# Patient Record
Sex: FEMALE | Race: WHITE | NOT HISPANIC OR LATINO | URBAN - METROPOLITAN AREA
[De-identification: names, ages, dates, MRNs, and addresses within clinical notes are randomized per-mention and may not be internally consistent; named-entity substitution may affect disease eponyms.]

---

## 2020-04-20 ENCOUNTER — TELEPHONE (OUTPATIENT)
Dept: HEMATOLOGY/ONCOLOGY | Facility: CLINIC | Age: 55
End: 2020-04-20

## 2020-04-20 NOTE — TELEPHONE ENCOUNTER
Returned call to pt, explained need for medical records. Will email her the [facility] & once received, we can request her records & get her set up. She voiced understanding.

## 2020-04-20 NOTE — TELEPHONE ENCOUNTER
----- Message from Natasha Correia sent at 4/20/2020 10:24 AM CDT -----  Contact: Self      ----- Message -----  From: Kusum Apple  Sent: 4/20/2020  10:22 AM CDT  To: [facility] Staff    Pt is calling because Len Werner diagnosed her with blood clots in both lungs.  [facility] did not take her insurance and recommended she come to [facility], Dr. Cattie to be seen.  She is scared and requests to be seen, as soon as possible.    She can be reached at [phone].    Thank you.

## 2020-04-23 NOTE — TELEPHONE ENCOUNTER
Records received & scanned into media, called pt & scheduled virtual visit for tomorrow morning with . Pt very appreciative. Explained instructions for virtual visit & provided her with my number should she have any issues.

## 2020-04-24 ENCOUNTER — OFFICE VISIT (OUTPATIENT)
Dept: HEMATOLOGY/ONCOLOGY | Facility: CLINIC | Age: 55
End: 2020-04-24
Payer: COMMERCIAL

## 2020-04-24 DIAGNOSIS — I82.4Y3 ACUTE DEEP VEIN THROMBOSIS (DVT) OF PROXIMAL VEIN OF BOTH LOWER EXTREMITIES: ICD-10-CM

## 2020-04-24 DIAGNOSIS — E66.9 OBESITY (BMI 35.0-39.9 WITHOUT COMORBIDITY): ICD-10-CM

## 2020-04-24 DIAGNOSIS — I82.4Y3 ACUTE DEEP VEIN THROMBOSIS (DVT) OF PROXIMAL VEIN OF BOTH LOWER EXTREMITIES: Primary | ICD-10-CM

## 2020-04-24 DIAGNOSIS — M06.9 RHEUMATOID ARTHRITIS, INVOLVING UNSPECIFIED SITE, UNSPECIFIED RHEUMATOID FACTOR PRESENCE: ICD-10-CM

## 2020-04-24 DIAGNOSIS — I10 ESSENTIAL HYPERTENSION: ICD-10-CM

## 2020-04-24 DIAGNOSIS — Z86.711 PERSONAL HISTORY OF PULMONARY EMBOLISM: ICD-10-CM

## 2020-04-24 DIAGNOSIS — E05.90 HYPERTHYROIDISM: ICD-10-CM

## 2020-04-24 DIAGNOSIS — I26.09 ACUTE PULMONARY EMBOLISM WITH ACUTE COR PULMONALE, UNSPECIFIED PULMONARY EMBOLISM TYPE: ICD-10-CM

## 2020-04-24 PROBLEM — I82.4Y9 ACUTE DEEP VEIN THROMBOSIS (DVT) OF PROXIMAL VEIN OF LOWER EXTREMITY: Status: ACTIVE | Noted: 2020-04-24

## 2020-04-24 PROCEDURE — 99203 OFFICE O/P NEW LOW 30 MIN: CPT | Mod: 95,,, | Performed by: INTERNAL MEDICINE

## 2020-04-24 PROCEDURE — 99203 PR OFFICE/OUTPT VISIT, NEW, LEVL III, 30-44 MIN: ICD-10-PCS | Mod: 95,,, | Performed by: INTERNAL MEDICINE

## 2020-04-24 RX ORDER — METHIMAZOLE 5 MG/1
5 TABLET ORAL EVERY OTHER DAY
Qty: 15 TABLET | Refills: 11 | Status: SHIPPED | OUTPATIENT
Start: 2020-04-24 | End: 2020-10-13 | Stop reason: SDUPTHER

## 2020-04-24 RX ORDER — CAPTOPRIL 12.5 MG/1
12.5 TABLET ORAL 2 TIMES DAILY
Qty: 180 TABLET | Refills: 3 | Status: SHIPPED | OUTPATIENT
Start: 2020-04-24 | End: 2020-10-13 | Stop reason: SDUPTHER

## 2020-04-24 RX ORDER — BISOPROLOL FUMARATE AND HYDROCHLOROTHIAZIDE 5; 6.25 MG/1; MG/1
1 TABLET ORAL DAILY
Qty: 30 TABLET | Refills: 11 | Status: SHIPPED | OUTPATIENT
Start: 2020-04-24 | End: 2020-10-13 | Stop reason: SDUPTHER

## 2020-04-24 NOTE — PROGRESS NOTES
CONSULT TELEMEDICINE VISIT    Subjective:      Patient ID: Francia Rod is a 55 y.o. female.    An audio and visual care visit was performed with the patient because of the COVID-19 pandemic recommendations for social distancing.    TELEMEDICINE  The patient location is: home  The chief complaint leading to consultation is: Pulmonary embolism   Visit type: Virtual visit with synchronous audio and video  Total time spent with patient: 35 minutes  Each patient to whom he or she provides medical services by telemedicine is:  (1) informed of the relationship between the physician and patient and the respective role of any other health care provider with respect to management of the patient; and (2) notified that he or she may decline to receive medical services by telemedicine and may withdraw from such care at any time.    History of Present Illness:   HPI   Patient is a 55 year old woman who was recently admitted to [facility] with bilateral PE and DVT earlier this month. She reports a long car ride to [place] in March. 3 days prior to admission, she developed worsening shortness of breath, substernal and exacerbated by minimal activity. She also reported a low grade fever of 100.3.   In the ER, a CTA showed several bilateral pulmonary emboli with signs of cardiac strain. Venous ultrasound performed showed DVT involving the left femoral vein, popliteal vein and left posterior tibialis vein and the right posterior tibialis vein. D dimer was elevated to 6.55. BNP was 6923. She was started on full dose lovenox . She tested negative for COVID-19.   She was seen by Dr. MALIK conde and a catheter directed thrombolysis was attempted for acute submassive PE and acute cor pulmonale; however, it was unsuccessful. The patient continued to improve on Lovenox and was switched to Eliquis 5 mg po bid at the time of discharge.     This is her second PE. She reports having sudden shortness of breath in 2006 and was diagnosed with a PE.  It was attributed to her OCP use which was then stopped. She was treated with warfarin for 6 months.   She denies any family history of DVT, PE, clotting or bleeding disorders.     She also has a history of RA, hyperthryroidism, HTN and morbid obesity.     Oncology History:   No history exists.      Past medical, surgical, family, and social history were reviewed today and there are no changes of note unless mentioned in HPI.   MEDS and ALLERGIES were reviewed with patient and meds reconciled.     History:  No past medical history on file.   History reviewed. No pertinent surgical history.  No family history on file.   Social History     Tobacco Use    Smoking status: Not on file   Substance and Sexual Activity    Alcohol use: Not on file    Drug use: Not on file    Sexual activity: Not on file        ROS:  Review of Systems   Constitutional: Negative for fever, malaise/fatigue and weight loss.   HENT: Negative for congestion, hearing loss, nosebleeds and sore throat.    Eyes: Negative for double vision and photophobia.   Respiratory: Positive for shortness of breath (improving ). Negative for cough, hemoptysis, sputum production and wheezing.    Cardiovascular: Negative for chest pain, palpitations, orthopnea and leg swelling.   Gastrointestinal: Negative for abdominal pain, blood in stool, constipation, diarrhea, heartburn, nausea and vomiting.   Genitourinary: Negative for dysuria, hematuria and urgency.   Musculoskeletal: Positive for joint pain. Negative for back pain and myalgias.   Skin: Negative for itching and rash.   Neurological: Negative for dizziness, tingling, seizures, weakness and headaches.   Endo/Heme/Allergies: Negative for polydipsia. Does not bruise/bleed easily.   Psychiatric/Behavioral: Positive for depression. Negative for memory loss. The patient is not nervous/anxious and does not have insomnia.        Objective:   There were no vitals filed for this visit.  Wt Readings from Last 10  Encounters:   No data found for Wt       Physical Examination:   Constitutional: she is alert, pleasant, and does not appear to be in any physical distress   HENT: Mouth/Throat:  Tongue is midline without evidence of glossitis  Eyes: No obvious jaundice or conjunctivitis.  EOM are normal.   Pulmonary/Chest: No stridor noted. No excess chest muscle movement.  Neurological: she is alert and oriented to person, place, and time. No cranial nerve deficit.  Skin:  No rash noted. No erythema.   Psychiatric: she has a normal mood and affect. Speech and memory normal.     Diagnostic Tests:  Significant Imaging: I have reviewed and interpreted all pertinent imaging results/findings.    Laboratory Data:  All pertinent labs have been reviewed.    Labs:   No results found for: WBC, RBC, HGB, HCT, MCV, PLT, GLU, NA, K, BUN, CREATININE, AST, ALT, BILITOT  Assessment/Plan:   Acute deep vein thrombosis (DVT) of proximal vein of both lower extremities  Acute pulmonary embolism with acute cor pulmonale, unspecified pulmonary embolism type  Personal history of pulmonary embolism    This is her second episode of PE, with acute cor pulmonale. We discussed that clinically, she is hypercoagulable. I would check hypercoagulable work up prior to her next visit. Regardless, she needs indefinite anticoagulation as tolerated. Continue Eliquis 5 mg po bid.   She also needs cancer screening. She is due for a screening mammogram and a colonoscopy and we will order them once COVID-19 restrictions are lifted.     Rheumatoid arthritis, involving unspecified site, unspecified rheumatoid factor presence  Previously treated with MTX, currently off treatment. Follow up with her rheumatologist.     Essential hypertension  Continue Captopril and bisoprolol-hctz. I will refill her medications as she does not have a PMD as of now. We will refer to PCP    Hyperthyroidism  Continue Methimazole. Recent TFTs are normal, reported on outside records.     Obesity  (BMI 35.0-39.9 without comorbidity)  The importance of a healthy diet and exercise was emphasized.        ECOG SCORE    1 - Restricted in strenuous activity-ambulatory and able to carry out work of a light nature       Discussion:   Follow up in about 3 months (around 7/24/2020) for MD, Lab Results.    Plan was discussed with the patient at length, and she verbalized understanding. Francia was given an opportunity to ask questions that were answered to her satisfaction, and she was advised to call in the interval if any problems or questions arise.  Discussed COVID-19 and social distancing in great detail, avoid all non-essential visits out of the home if possible and avoid sick contacts.     Electronically signed by Kim Romo MD

## 2020-04-27 RX ORDER — CAPTOPRIL 12.5 MG/1
12.5 TABLET ORAL 3 TIMES DAILY
Qty: 270 TABLET | Refills: 3 | Status: SHIPPED | OUTPATIENT
Start: 2020-04-27 | End: 2020-10-23 | Stop reason: SDUPTHER

## 2020-06-02 ENCOUNTER — HOSPITAL ENCOUNTER (EMERGENCY)
Facility: HOSPITAL | Age: 55
Discharge: HOME | End: 2020-06-02
Attending: EMERGENCY MEDICINE
Payer: COMMERCIAL

## 2020-06-02 VITALS
TEMPERATURE: 99 F | SYSTOLIC BLOOD PRESSURE: 218 MMHG | RESPIRATION RATE: 18 BRPM | WEIGHT: 239 LBS | OXYGEN SATURATION: 98 % | HEIGHT: 64 IN | HEART RATE: 82 BPM | BODY MASS INDEX: 40.8 KG/M2 | DIASTOLIC BLOOD PRESSURE: 93 MMHG

## 2020-06-02 DIAGNOSIS — R10.9 ABDOMINAL PAIN: ICD-10-CM

## 2020-06-02 DIAGNOSIS — R10.13 EPIGASTRIC ABDOMINAL PAIN: Primary | ICD-10-CM

## 2020-06-02 PROCEDURE — 93010 ELECTROCARDIOGRAM REPORT: CPT | Mod: ,,, | Performed by: INTERNAL MEDICINE

## 2020-06-02 PROCEDURE — 93005 ELECTROCARDIOGRAM TRACING: CPT

## 2020-06-02 PROCEDURE — 99285 PR EMERGENCY DEPT VISIT,LEVEL V: ICD-10-PCS | Mod: ,,, | Performed by: EMERGENCY MEDICINE

## 2020-06-02 PROCEDURE — 93010 EKG 12-LEAD: ICD-10-PCS | Mod: ,,, | Performed by: INTERNAL MEDICINE

## 2020-06-02 PROCEDURE — 99285 EMERGENCY DEPT VISIT HI MDM: CPT | Mod: ,,, | Performed by: EMERGENCY MEDICINE

## 2020-06-02 PROCEDURE — 99283 EMERGENCY DEPT VISIT LOW MDM: CPT | Mod: 25

## 2020-06-03 NOTE — ED TRIAGE NOTES
Francia WHEELER Marcel, a 55 y.o. female presents to the ED w/ complaint of     Triage note:  Chief Complaint   Patient presents with    Abdominal Pain     Pt states having blood clot on 4/12 and started on eliquis. Pt states having right sided pain mainly under ribs since Sunday. Pt describes pain as sharp and intermittent. Pt denies SOB and chest pain.     Review of patient's allergies indicates:  No Known Allergies  Past Medical History:   Diagnosis Date    Arthritis     DVT (deep venous thrombosis)     Hypertension     Thyroid disease

## 2020-06-03 NOTE — ED PROVIDER NOTES
Encounter Date: 6/2/2020       History     Chief Complaint   Patient presents with    Abdominal Pain     Pt states having blood clot on 4/12 and started on eliquis. Pt states having right sided pain mainly under ribs since Sunday. Pt describes pain as sharp and intermittent. Pt denies SOB and chest pain.     HPI   55-year-old female with a history of DVT on Eliquis, hypertension, arthritis and thyroid disease presenting with right-sided epigastric and abdominal pain that was intermittent.  Pain rated at 1/10 and described as gassy/colic pain in the epigastrium and right side.  Denies any chest pain, lightheadedness, dizziness, shortness of breath, dyspnea, back pain, pleuritic pain, lightheadedness or dizziness.  No other symptoms at this time.  Pain is described as intermittent, and sharp.  Denies any recent traumas, urinary symptoms, fevers, chills, nausea or vomiting.    Review of patient's allergies indicates:  No Known Allergies  Past Medical History:   Diagnosis Date    Arthritis     DVT (deep venous thrombosis)     Hypertension     Thyroid disease      Past Surgical History:   Procedure Laterality Date    CHOLECYSTECTOMY       History reviewed. No pertinent family history.  Social History     Tobacco Use    Smoking status: Never Smoker    Smokeless tobacco: Never Used   Substance Use Topics    Alcohol use: Yes    Drug use: Never     Review of Systems   Constitutional: Negative for chills, fatigue and fever.   HENT: Negative for congestion, mouth sores, sinus pressure and sore throat.    Eyes: Negative for photophobia.   Respiratory: Negative for cough, shortness of breath, wheezing and stridor.    Cardiovascular: Negative for chest pain, palpitations and leg swelling.   Gastrointestinal: Positive for abdominal pain. Negative for constipation, diarrhea, nausea, rectal pain and vomiting.   Genitourinary: Negative for dysuria, flank pain and hematuria.   Musculoskeletal: Negative for gait problem,  joint swelling, myalgias, neck pain and neck stiffness.   Skin: Negative for color change, pallor, rash and wound.   Neurological: Negative for tremors, facial asymmetry, weakness, light-headedness, numbness and headaches.   Psychiatric/Behavioral: Negative for agitation and confusion. The patient is not nervous/anxious.        Physical Exam     Initial Vitals [06/02/20 2046]   BP Pulse Resp Temp SpO2   (!) 218/93 82 18 98.7 °F (37.1 °C) 98 %      MAP       --         Physical Exam    Nursing note and vitals reviewed.    Gen/Constitutional: Interactive. No acute distress  Head: Normocephalic, Atraumatic  Neck: supple, no masses or LAD, no JVD  Eyes: PERRLA, conjunctiva clear  Ears, Nose and Throat: No rhinorrhea or stridor.  Cardiac:  Regular rate, Reg Rhythm, No murmur, rubs or gallops  Pulmonary: CTA Bilat, no wheezes, rhonchi, rales.  GI: Abdomen soft, non-tender, non-distended; no rebound or guarding  : No CVA tenderness.  Musculoskeletal: Extremities warm, well perfused, no erythema, no edema  Skin: No rashes, cyanosis or jaundice  Neuro: Alert and Oriented x 3; No focal motor or sensory deficits.    Psych: Normal affect      ED Course   Procedures  Labs Reviewed - No data to display       Imaging Results    None          Medical Decision Making:   History:   Old Medical Records: I decided to obtain old medical records.  Initial Assessment:   55-year-old female with a history of DVT on Eliquis, hypertension, arthritis and thyroid disease presenting with right-sided epigastric and abdominal pain that was intermittent.  Differential Diagnosis:   Differential diagnosis includes but not limited to:  Musculoskeletal, reflux, gas pain, ACS, PE, infectious etiology, perforation, obstruction.  Independently Interpreted Test(s):   I have ordered and independently interpreted EKG Reading(s) - see prior notes  Clinical Tests:   Medical Tests: Ordered and Reviewed    Urgent evaluation of patient presenting with  epigastric and abdominal pain that is intermittent, mild in severity.  She is afebrile vital signs are stable.  There is no tachycardia, hypoxemia or tachypnea.  She is hypertensive at baseline, and is on current medications.  She denies any active chest pain, or any other symptoms.  Lung sounds clear, cardiac exam unremarkable.  ECG obtained with no signs of ischemia or STEMI on my read.  Normal sinus rhythm throughout.  On repeat evaluation, no tachycardia, nontender exam, and does not describe any pleuritic pain.  She has a history of DVTs currently on Eliquis and taking Eliquis as prescribed.  No high risk features to include tachycardia, tachypnea or hypoxemia, doubt PE.  Doubt ACS based on ECG, exam and history and findings.  I had a long discussion with the patient regarding further imaging to include CTA PE protocol however given absence of ECG findings, no tachycardia, no tachypnea or hypoxemia with no clinical findings to suggest dyspnea or shortness of breath or pleuritic pain, patient is low risk at this time.  I did offer her CT scan however based on a shared discussion and shared model, will wait on any further imaging as symptoms are improving and unlikely to be PE.  However if symptoms worsen, patient struck to return to the emergency department for close evaluation and further imaging. Patient agreeable to discharge plan. Strict ED precautions and return instructions discussed at length and patient verbalized understanding. All questions were answered and ample time was given for questions.      Complexity:  High risk                               Clinical Impression:       ICD-10-CM ICD-9-CM   1. Epigastric abdominal pain R10.13 789.06   2. Abdominal pain R10.9 789.00         Disposition:   Disposition: Discharged  Condition: Stable     ED Disposition Condition    Discharge Stable        ED Prescriptions     None        Follow-up Information     Follow up With Specialties Details Why Contact Info  Additional Information    [facility] - Internal Medicine Internal Medicine Schedule an appointment as soon as possible for a visit in 3 days If symptoms worsen [address] [id number] [phone] [facility] Bld[facility].    [facility] Emergency Medicine Go to  If symptoms worsen [address] [id number] [phone]                     Dre Wells DO, [facility]  Emergency Staff Physician   Dept of Emergency Medicine   [facility]  Spectralink: [id number]                     Dre Wells DO  06/02/20 2328

## 2020-06-03 NOTE — DISCHARGE INSTRUCTIONS
Today, your evaluation included vital signs and exam.  Your blood pressure was elevated, as you have hypertension.  However your pulse, oxygen levels and breathing rate were all normal.  We had a long discussion regarding taking Eliquis, and symptoms of blood clots.  You deny having any chest pain or shortness of breath.  However if the symptoms do occur, please return to the emergency department or follow up with your primary care immediately.  Continue taking your Eliquis as prescribed.    Our goal in the emergency department is to always give you outstanding care and exceptional service. You may receive a survey by mail or e-mail in the next week regarding your experience in our ED. We would greatly appreciate your completing and returning the survey. Your feedback provides us with a way to recognize our staff who give very good care and it helps us learn how to improve when your experience was below our aspiration of excellence.

## 2020-06-19 DIAGNOSIS — I82.4Y3 ACUTE DEEP VEIN THROMBOSIS (DVT) OF PROXIMAL VEIN OF BOTH LOWER EXTREMITIES: Primary | ICD-10-CM

## 2020-07-24 ENCOUNTER — OFFICE VISIT (OUTPATIENT)
Dept: HEMATOLOGY/ONCOLOGY | Facility: CLINIC | Age: 55
End: 2020-07-24
Payer: COMMERCIAL

## 2020-07-24 ENCOUNTER — LAB VISIT (OUTPATIENT)
Dept: LAB | Facility: HOSPITAL | Age: 55
End: 2020-07-24
Attending: INTERNAL MEDICINE
Payer: COMMERCIAL

## 2020-07-24 VITALS
DIASTOLIC BLOOD PRESSURE: 82 MMHG | OXYGEN SATURATION: 98 % | RESPIRATION RATE: 14 BRPM | BODY MASS INDEX: 42.01 KG/M2 | HEART RATE: 67 BPM | HEIGHT: 64 IN | WEIGHT: 246.06 LBS | SYSTOLIC BLOOD PRESSURE: 181 MMHG | TEMPERATURE: 98 F

## 2020-07-24 DIAGNOSIS — I26.09 ACUTE PULMONARY EMBOLISM WITH ACUTE COR PULMONALE, UNSPECIFIED PULMONARY EMBOLISM TYPE: Primary | ICD-10-CM

## 2020-07-24 DIAGNOSIS — I10 ESSENTIAL HYPERTENSION: ICD-10-CM

## 2020-07-24 DIAGNOSIS — I82.4Y3 ACUTE DEEP VEIN THROMBOSIS (DVT) OF PROXIMAL VEIN OF BOTH LOWER EXTREMITIES: ICD-10-CM

## 2020-07-24 DIAGNOSIS — Z86.711 PERSONAL HISTORY OF PULMONARY EMBOLISM: ICD-10-CM

## 2020-07-24 LAB
ALBUMIN SERPL BCP-MCNC: 4.2 G/DL (ref 3.5–5.2)
ALP SERPL-CCNC: 99 U/L (ref 38–145)
ALT SERPL W/O P-5'-P-CCNC: 20 U/L (ref 0–35)
ANION GAP SERPL CALC-SCNC: 6 MMOL/L (ref 8–16)
AST SERPL-CCNC: 36 U/L (ref 14–36)
BASOPHILS # BLD AUTO: 0.06 K/UL (ref 0–0.2)
BASOPHILS NFR BLD: 0.6 % (ref 0–1.9)
BILIRUB SERPL-MCNC: 0.6 MG/DL (ref 0.2–1.3)
BUN SERPL-MCNC: 19 MG/DL (ref 7–18)
CALCIUM SERPL-MCNC: 9.7 MG/DL (ref 8.4–10.2)
CHLORIDE SERPL-SCNC: 103 MMOL/L (ref 95–110)
CO2 SERPL-SCNC: 27 MMOL/L (ref 22–31)
CREAT SERPL-MCNC: 0.82 MG/DL (ref 0.5–1.4)
DIFFERENTIAL METHOD: ABNORMAL
EOSINOPHIL # BLD AUTO: 0.3 K/UL (ref 0–0.5)
EOSINOPHIL NFR BLD: 2.4 % (ref 0–8)
ERYTHROCYTE [DISTWIDTH] IN BLOOD BY AUTOMATED COUNT: 14.5 % (ref 11.5–14.5)
EST. GFR  (AFRICAN AMERICAN): >60 ML/MIN/1.73 M^2
EST. GFR  (NON AFRICAN AMERICAN): >60 ML/MIN/1.73 M^2
GLUCOSE SERPL-MCNC: 145 MG/DL (ref 70–110)
HCT VFR BLD AUTO: 41.1 % (ref 37–48.5)
HGB BLD-MCNC: 13.3 G/DL (ref 12–16)
IMM GRANULOCYTES # BLD AUTO: 0.03 K/UL (ref 0–0.04)
IMM GRANULOCYTES NFR BLD AUTO: 0.3 % (ref 0–0.5)
LYMPHOCYTES # BLD AUTO: 1.8 K/UL (ref 1–4.8)
LYMPHOCYTES NFR BLD: 17.5 % (ref 18–48)
MCH RBC QN AUTO: 30.7 PG (ref 27–31)
MCHC RBC AUTO-ENTMCNC: 32.4 G/DL (ref 32–36)
MCV RBC AUTO: 95 FL (ref 82–98)
MONOCYTES # BLD AUTO: 0.8 K/UL (ref 0.3–1)
MONOCYTES NFR BLD: 7.8 % (ref 4–15)
NEUTROPHILS # BLD AUTO: 7.4 K/UL (ref 1.8–7.7)
NEUTROPHILS NFR BLD: 71.4 % (ref 38–73)
NRBC BLD-RTO: 0 /100 WBC
PLATELET # BLD AUTO: 306 K/UL (ref 150–350)
PMV BLD AUTO: 10.7 FL (ref 9.2–12.9)
POTASSIUM SERPL-SCNC: 4.5 MMOL/L (ref 3.5–5.1)
PROT SERPL-MCNC: 8.2 G/DL (ref 6–8.4)
RBC # BLD AUTO: 4.33 M/UL (ref 4–5.4)
SODIUM SERPL-SCNC: 136 MMOL/L (ref 136–145)
WBC # BLD AUTO: 10.41 K/UL (ref 3.9–12.7)

## 2020-07-24 PROCEDURE — 3079F DIAST BP 80-89 MM HG: CPT | Mod: CPTII,S$GLB,, | Performed by: INTERNAL MEDICINE

## 2020-07-24 PROCEDURE — 99214 OFFICE O/P EST MOD 30 MIN: CPT | Mod: S$GLB,,, | Performed by: INTERNAL MEDICINE

## 2020-07-24 PROCEDURE — 80053 COMPREHEN METABOLIC PANEL: CPT

## 2020-07-24 PROCEDURE — 3077F PR MOST RECENT SYSTOLIC BLOOD PRESSURE >= 140 MM HG: ICD-10-PCS | Mod: CPTII,S$GLB,, | Performed by: INTERNAL MEDICINE

## 2020-07-24 PROCEDURE — 3008F PR BODY MASS INDEX (BMI) DOCUMENTED: ICD-10-PCS | Mod: CPTII,S$GLB,, | Performed by: INTERNAL MEDICINE

## 2020-07-24 PROCEDURE — 3077F SYST BP >= 140 MM HG: CPT | Mod: CPTII,S$GLB,, | Performed by: INTERNAL MEDICINE

## 2020-07-24 PROCEDURE — 85025 COMPLETE CBC W/AUTO DIFF WBC: CPT

## 2020-07-24 PROCEDURE — 3079F PR MOST RECENT DIASTOLIC BLOOD PRESSURE 80-89 MM HG: ICD-10-PCS | Mod: CPTII,S$GLB,, | Performed by: INTERNAL MEDICINE

## 2020-07-24 PROCEDURE — 80053 COMPREHEN METABOLIC PANEL: CPT | Mod: PN

## 2020-07-24 PROCEDURE — 99214 PR OFFICE/OUTPT VISIT, EST, LEVL IV, 30-39 MIN: ICD-10-PCS | Mod: S$GLB,,, | Performed by: INTERNAL MEDICINE

## 2020-07-24 PROCEDURE — 36415 COLL VENOUS BLD VENIPUNCTURE: CPT | Mod: PN

## 2020-07-24 PROCEDURE — 99999 PR PBB SHADOW E&M-EST. PATIENT-LVL IV: ICD-10-PCS | Mod: PBBFAC,,, | Performed by: INTERNAL MEDICINE

## 2020-07-24 PROCEDURE — 85025 COMPLETE CBC W/AUTO DIFF WBC: CPT | Mod: PN

## 2020-07-24 PROCEDURE — 99999 PR PBB SHADOW E&M-EST. PATIENT-LVL IV: CPT | Mod: PBBFAC,,, | Performed by: INTERNAL MEDICINE

## 2020-07-24 PROCEDURE — 3008F BODY MASS INDEX DOCD: CPT | Mod: CPTII,S$GLB,, | Performed by: INTERNAL MEDICINE

## 2020-07-24 NOTE — PROGRESS NOTES
PROGRESS NOTE    Subjective:       Patient ID: Francia Rod is a 55 y.o. female.    Chief Complaint: Results      History of Present Illness:   Francia Rod is a 55 y.o. female who presents with recurrent DVT and PE.     Briefly, she was diagnosed with a PE in 2006 when she presented with sudden shortness of breath.  This episode was attributed to OCP use which was then stopped.  She was treated with warfarin for 6 months.    More recently, in April 2020, she presented to [facility] with bilateral PE and DVT.  She does report a long car ride to and [place].  Three days prior to admission, she developed worsening shortness of breath, substernal chest pain exacerbated with minimal activity as well as a low-grade fever.    As previously documented,a CTA showed several bilateral pulmonary emboli with signs of cardiac strain. Venous ultrasound performed showed DVT involving the left femoral vein, popliteal vein and left posterior tibialis vein and the right posterior tibialis vein. D dimer was elevated to 6.55. BNP was 6923. She was started on full dose lovenox . She tested negative for COVID-19.   She was seen by Dr. MALIK conde and a catheter directed thrombolysis was attempted for acute submassive PE and acute cor pulmonale; however, it was unsuccessful. The patient continued to improve on Lovenox and was switched to Eliquis 5 mg po bid at the time of discharge.     She has remained on Apixaban since her last visit. I did receive a message from her in June when she developed right-sided chest pain and was referred back to the emergency department.  Workup was negative and she was discharged home after her symptoms improved spontaneously.  Since then, she reports mild-to-moderate fatigue as well as rheumatoid arthritis-related joint pains.  She has recently resumed weekly methotrexate.  She denies any leg swelling, chest pain or shortness of breath  today.  She continues to tolerate apixaban well without any easy bruising or bleeding.    Oncology History:  Oncology History    No history exists.       History:  Past Medical History:   Diagnosis Date    Arthritis     DVT (deep venous thrombosis)     Hypertension     Thyroid disease       Past Surgical History:   Procedure Laterality Date    CHOLECYSTECTOMY       Family History   Problem Relation Age of Onset    Heart attack Mother     Rheum arthritis Father     Cancer Father     Heart attack Paternal Uncle     Colon cancer Paternal Grandmother     Heart attack Maternal Uncle      Social History     Tobacco Use    Smoking status: Never Smoker    Smokeless tobacco: Never Used   Substance and Sexual Activity    Alcohol use: Yes    Drug use: Never    Sexual activity: Not on file        ROS:   Review of Systems   Constitutional: Positive for malaise/fatigue. Negative for fever and weight loss.   HENT: Negative for congestion, hearing loss, nosebleeds and sore throat.    Eyes: Negative for double vision and photophobia.   Respiratory: Negative for cough, hemoptysis, sputum production, shortness of breath and wheezing.    Cardiovascular: Negative for chest pain, palpitations, orthopnea and leg swelling.   Gastrointestinal: Negative for abdominal pain, blood in stool, constipation, diarrhea, heartburn, nausea and vomiting.   Genitourinary: Negative for dysuria, hematuria and urgency.   Musculoskeletal: Positive for joint pain (RA, on MTX weekly ). Negative for back pain and myalgias.   Skin: Negative for itching and rash.   Neurological: Negative for dizziness, tingling, seizures, weakness and headaches.   Endo/Heme/Allergies: Negative for polydipsia. Does not bruise/bleed easily.   Psychiatric/Behavioral: Negative for depression and memory loss. The patient is not nervous/anxious and does not have insomnia.         Objective:     Vitals:    07/24/20 1046   BP: (!) 181/82   Pulse: 67   Resp: 14   Temp:  "97.8 °F (36.6 °C)   TempSrc: Temporal   SpO2: 98%   Weight: 111.6 kg (246 lb 0.5 oz)   Height: 5' 4" (1.626 m)   PainSc: 0-No pain     Wt Readings from Last 10 Encounters:   07/24/20 111.6 kg (246 lb 0.5 oz)   06/02/20 108.4 kg (239 lb)       Physical Examination:   Physical Exam   Constitutional: She is oriented to person, place, and time and well-developed, well-nourished, and in no distress. No distress.   HENT:   Head: Normocephalic.   Mouth/Throat: Oropharynx is clear and moist. No oropharyngeal exudate.   Eyes: Conjunctivae and EOM are normal. No scleral icterus.   Neck: Neck supple. No thyromegaly present.   Cardiovascular: Normal rate, regular rhythm and normal heart sounds.   No murmur heard.  Pulmonary/Chest: Effort normal. No stridor. No respiratory distress. She has no wheezes. She has no rales. She exhibits no tenderness.   Abdominal: Soft. Bowel sounds are normal. She exhibits no distension and no mass. There is no abdominal tenderness. There is no rebound.   Musculoskeletal: Normal range of motion.         General: No tenderness, deformity or edema.   Lymphadenopathy:     She has no cervical adenopathy.   Neurological: She is alert and oriented to person, place, and time. No cranial nerve deficit. Gait normal. Coordination normal.   Skin: Skin is warm and dry. No rash noted. She is not diaphoretic. No erythema.   Psychiatric: Memory, affect and judgment normal.   Nursing note and vitals reviewed.       Diagnostic Tests:  Significant Imaging: I have reviewed and interpreted all pertinent imaging results/findings.  CT Chest Without Contrast No results found for this or any previous visit.  CT Chest With ContrastNo results found for this or any previous visit.  CT Abdomen/Pelvis Without Contrast No results found for this or any previous visit.   CT Abdomen/Pelvis With Contrast No results found for this or any previous visit.  CT Abdomen/Pelvis With Without Contrast No results found for this or any " previous visit.   PET Scan No results found for this or any previous visit.    Laboratory Data:  All pertinent labs have been reviewed.  Labs:   Lab Results   Component Value Date    WBC 10.41 07/24/2020    RBC 4.33 07/24/2020    HGB 13.3 07/24/2020    HCT 41.1 07/24/2020    MCV 95 07/24/2020     07/24/2020     (H) 07/24/2020     07/24/2020    K 4.5 07/24/2020    BUN 19 (H) 07/24/2020    CREATININE 0.82 07/24/2020    AST 36 07/24/2020    ALT 20 07/24/2020    BILITOT 0.6 07/24/2020       Assessment/Plan:   Acute pulmonary embolism with acute cor pulmonale, unspecified pulmonary embolism type  Acute deep vein thrombosis (DVT) of proximal vein of both lower extremities  Personal history of pulmonary embolism  This is her second episode of PE, with acute cor pulmonale. We discussed that clinically, she is hypercoagulable and I would continue anticoagulation indefinitely.   Labs are within normal limits. She did not complete hypercoagulable work up and I will order it prior to her next visit.    Continue Eliquis 5 mg po bid.     She also needs cancer screening. She is due for a screening mammogram and a colonoscopy and she will schedule these through her PMD.      Essential hypertension  Systolic blood pressure is in the 180s range during today's visit.  She attributes it to anxiety associated with a clinic visit.  She does report that her blood pressure is better controlled when she checks at home.  I have instructed her to start recording blood pressure at home and keep a log that she should discuss with her primary care physician and make adjustments as needed.  I discussed the importance of a normal blood pressure while on anticoagulation.   She is in need of a primary care physician and will find one close to house.  We are able to assist if she needs any help.  She verbalized understanding.    ECOG SCORE    0 - Fully active-able to carry on all pre-disease performance without restriction            Discussion:   Follow up in about 3 months (around 10/24/2020) for MD.    Plan was discussed with the patient at length, and she verbalized understanding. Francia was given an opportunity to ask questions that were answered to her satisfaction, and she was advised to call in the interval if any problems or questions arise.    Electronically signed by Kim Romo MD

## 2020-10-11 ENCOUNTER — PATIENT MESSAGE (OUTPATIENT)
Dept: HEMATOLOGY/ONCOLOGY | Facility: CLINIC | Age: 55
End: 2020-10-11

## 2020-10-12 ENCOUNTER — PATIENT MESSAGE (OUTPATIENT)
Dept: HEMATOLOGY/ONCOLOGY | Facility: CLINIC | Age: 55
End: 2020-10-12

## 2020-10-13 DIAGNOSIS — I10 ESSENTIAL HYPERTENSION: ICD-10-CM

## 2020-10-13 DIAGNOSIS — I82.4Y3 ACUTE DEEP VEIN THROMBOSIS (DVT) OF PROXIMAL VEIN OF BOTH LOWER EXTREMITIES: ICD-10-CM

## 2020-10-13 DIAGNOSIS — Z86.711 PERSONAL HISTORY OF PULMONARY EMBOLISM: Primary | ICD-10-CM

## 2020-10-13 DIAGNOSIS — I26.09 ACUTE PULMONARY EMBOLISM WITH ACUTE COR PULMONALE, UNSPECIFIED PULMONARY EMBOLISM TYPE: ICD-10-CM

## 2020-10-13 DIAGNOSIS — E05.90 HYPERTHYROIDISM: ICD-10-CM

## 2020-10-14 RX ORDER — BISOPROLOL FUMARATE AND HYDROCHLOROTHIAZIDE 5; 6.25 MG/1; MG/1
1 TABLET ORAL DAILY
Qty: 30 TABLET | Refills: 11 | Status: SHIPPED | OUTPATIENT
Start: 2020-10-14 | End: 2020-10-23 | Stop reason: SDUPTHER

## 2020-10-14 RX ORDER — METHIMAZOLE 5 MG/1
5 TABLET ORAL EVERY OTHER DAY
Qty: 15 TABLET | Refills: 11 | Status: SHIPPED | OUTPATIENT
Start: 2020-10-14 | End: 2021-10-25

## 2020-10-14 RX ORDER — CAPTOPRIL 12.5 MG/1
12.5 TABLET ORAL 2 TIMES DAILY
Qty: 180 TABLET | Refills: 3 | Status: SHIPPED | OUTPATIENT
Start: 2020-10-14 | End: 2021-11-10

## 2020-10-16 ENCOUNTER — TELEPHONE (OUTPATIENT)
Dept: HEMATOLOGY/ONCOLOGY | Facility: CLINIC | Age: 55
End: 2020-10-16

## 2020-10-16 NOTE — TELEPHONE ENCOUNTER
----- Message from Natalie Ness sent at 10/16/2020  8:40 AM CDT -----  Contact: pt  Patient called she is at [facility] now in [facility] and they do not have her lab orders.  Will you be able to fax them to  [phone]     Call back [phone]

## 2020-10-16 NOTE — TELEPHONE ENCOUNTER
Unable to reach Lashaun and person at main number unable to assist with what they need to process the lab

## 2020-10-16 NOTE — TELEPHONE ENCOUNTER
S/w pt and Compology re: lab orders faxed/not received.  Gave verbal orders and dx codes needed with call back information if she needs clarification.

## 2020-10-16 NOTE — TELEPHONE ENCOUNTER
----- Message from Jessica Kapadia sent at 10/16/2020 12:45 PM CDT -----  Lashaun with [employer] is calling.    She needs the test code for cardiolipin antibody.  Please call her at [phone] with that code. Thank you!

## 2020-10-21 LAB
ALBUMIN SERPL-MCNC: 4 G/DL (ref 3.6–5.1)
ALBUMIN/GLOB SERPL: 1.1 (CALC) (ref 1–2.5)
ALP SERPL-CCNC: 91 U/L (ref 37–153)
ALT SERPL-CCNC: 29 U/L (ref 6–29)
AST SERPL-CCNC: 25 U/L (ref 10–35)
AT III ACT/NOR PPP CHRO: 115 % NORMAL (ref 80–135)
B2 GLYCOPROT1 IGG SER-ACNC: <9 SGU
B2 GLYCOPROT1 IGM SER-ACNC: <9 SMU
BASOPHILS # BLD AUTO: 42 CELLS/UL (ref 0–200)
BASOPHILS NFR BLD AUTO: 0.5 %
BILIRUB SERPL-MCNC: 0.6 MG/DL (ref 0.2–1.2)
BUN SERPL-MCNC: 17 MG/DL (ref 7–25)
BUN/CREAT SERPL: ABNORMAL (CALC) (ref 6–22)
CALCIUM SERPL-MCNC: 9.5 MG/DL (ref 8.6–10.4)
CARDIOLIPIN IGA SER IA-ACNC: <11 APL
CHLORIDE SERPL-SCNC: 102 MMOL/L (ref 98–110)
CO2 SERPL-SCNC: 26 MMOL/L (ref 20–32)
CREAT SERPL-MCNC: 0.9 MG/DL (ref 0.5–1.05)
EOSINOPHIL # BLD AUTO: 126 CELLS/UL (ref 15–500)
EOSINOPHIL NFR BLD AUTO: 1.5 %
ERYTHROCYTE [DISTWIDTH] IN BLOOD BY AUTOMATED COUNT: 13.6 % (ref 11–15)
F2 C.20210G>A GENO BLD/T: NORMAL
F2 GENE MUT ANL BLD/T: NORMAL
F5 GENE MUT ANL BLD/T: NORMAL
F5 P.R506Q BLD/T QL: NORMAL
GFRSERPLBLD MDRD-ARVRAT: 71 ML/MIN/1.73M2
GLOBULIN SER CALC-MCNC: 3.6 G/DL (CALC) (ref 1.9–3.7)
GLUCOSE SERPL-MCNC: 129 MG/DL (ref 65–99)
HCT VFR BLD AUTO: 42.7 % (ref 35–45)
HGB BLD-MCNC: 13.8 G/DL (ref 11.7–15.5)
LYMPHOCYTES # BLD AUTO: 1588 CELLS/UL (ref 850–3900)
LYMPHOCYTES NFR BLD AUTO: 18.9 %
MCH RBC QN AUTO: 30.9 PG (ref 27–33)
MCHC RBC AUTO-ENTMCNC: 32.3 G/DL (ref 32–36)
MCV RBC AUTO: 95.5 FL (ref 80–100)
MONOCYTES # BLD AUTO: 437 CELLS/UL (ref 200–950)
MONOCYTES NFR BLD AUTO: 5.2 %
NEUTROPHILS # BLD AUTO: 6208 CELLS/UL (ref 1500–7800)
NEUTROPHILS NFR BLD AUTO: 73.9 %
PLATELET # BLD AUTO: 291 THOUSAND/UL (ref 140–400)
PMV BLD REES-ECKER: 11.6 FL (ref 7.5–12.5)
POTASSIUM SERPL-SCNC: 4.5 MMOL/L (ref 3.5–5.3)
PROT SERPL-MCNC: 7.6 G/DL (ref 6.1–8.1)
RBC # BLD AUTO: 4.47 MILLION/UL (ref 3.8–5.1)
SCREEN DRVVT: 39 SECONDS
SODIUM SERPL-SCNC: 137 MMOL/L (ref 135–146)
TRACKING HOUSE ACCOUNT: NORMAL
WBC # BLD AUTO: 8.4 THOUSAND/UL (ref 3.8–10.8)

## 2020-10-23 ENCOUNTER — OFFICE VISIT (OUTPATIENT)
Dept: HEMATOLOGY/ONCOLOGY | Facility: CLINIC | Age: 55
End: 2020-10-23
Payer: COMMERCIAL

## 2020-10-23 DIAGNOSIS — Z79.01 LONG TERM (CURRENT) USE OF ANTICOAGULANTS: ICD-10-CM

## 2020-10-23 DIAGNOSIS — I10 ESSENTIAL HYPERTENSION: ICD-10-CM

## 2020-10-23 DIAGNOSIS — I82.4Y3 ACUTE DEEP VEIN THROMBOSIS (DVT) OF PROXIMAL VEIN OF BOTH LOWER EXTREMITIES: Primary | ICD-10-CM

## 2020-10-23 DIAGNOSIS — Z86.711 PERSONAL HISTORY OF PULMONARY EMBOLISM: ICD-10-CM

## 2020-10-23 DIAGNOSIS — I26.09 ACUTE PULMONARY EMBOLISM WITH ACUTE COR PULMONALE, UNSPECIFIED PULMONARY EMBOLISM TYPE: ICD-10-CM

## 2020-10-23 DIAGNOSIS — D68.51 FACTOR V LEIDEN MUTATION: ICD-10-CM

## 2020-10-23 DIAGNOSIS — E05.90 HYPERTHYROIDISM: ICD-10-CM

## 2020-10-23 PROCEDURE — 99213 OFFICE O/P EST LOW 20 MIN: CPT | Mod: 95,,, | Performed by: INTERNAL MEDICINE

## 2020-10-23 PROCEDURE — 99213 PR OFFICE/OUTPT VISIT, EST, LEVL III, 20-29 MIN: ICD-10-PCS | Mod: 95,,, | Performed by: INTERNAL MEDICINE

## 2020-10-23 RX ORDER — BISOPROLOL FUMARATE AND HYDROCHLOROTHIAZIDE 5; 6.25 MG/1; MG/1
1 TABLET ORAL DAILY
Qty: 30 TABLET | Refills: 11 | Status: SHIPPED | OUTPATIENT
Start: 2020-10-23 | End: 2022-01-18 | Stop reason: SDUPTHER

## 2020-10-23 RX ORDER — CAPTOPRIL 12.5 MG/1
12.5 TABLET ORAL 3 TIMES DAILY
Qty: 270 TABLET | Refills: 3 | Status: SHIPPED | OUTPATIENT
Start: 2020-10-23 | End: 2021-05-14

## 2020-10-23 NOTE — PROGRESS NOTES
FOLLOW UP TELEMEDICINE VISIT    Subjective:      Patient ID: Francia Rod is a 55 y.o. female.  MRN: [id number]  : 1965    An audio and visual care visit was performed with the patient because of the COVID-19 pandemic recommendations for social distancing.    TELEMEDICINE  The patient location is: home  The chief complaint leading to consultation is:  Hypercoagulable state, pulmonary embolism  Visit type: Virtual visit with synchronous audio and video    Total time spent with patient: 15minutes  25 minutes of total time spent on the encounter, which includes face to face time and non-face to face time preparing to see the patient (eg, review of tests), obtaining and/or reviewing separately obtained history, documenting clinical information in the electronic or other health record, independently interpreting results (not separately reported) and communicating results to the patient/family/caregiver, or care coordination (not separately reported).    Each patient to whom he or she provides medical services by telemedicine is:  (1) informed of the relationship between the physician and patient and the respective role of any other health care provider with respect to management of the patient; and (2) notified that he or she may decline to receive medical services by telemedicine and may withdraw from such care at any time.    History of Present Illness:   HPI   Francia Rod is a 55 y.o. female who presents with recurrent DVT and PE.      Briefly, she was diagnosed with a PE in  when she presented with sudden shortness of breath.  This episode was attributed to OCP use which was then stopped.  She was treated with warfarin for 6 months.     More recently, in 2020, she presented to [facility] with bilateral PE and DVT.  She does report a long car ride to and [place].  Three days prior to admission, she developed worsening shortness of breath, substernal chest pain exacerbated with minimal  activity as well as a low-grade fever.     As previously documented,a CTA showed several bilateral pulmonary emboli with signs of cardiac strain. Venous ultrasound performed showed DVT involving the left femoral vein, popliteal vein and left posterior tibialis vein and the right posterior tibialis vein. D dimer was elevated to 6.55. BNP was 6923. She was started on full dose lovenox . She tested negative for COVID-19.   She was seen by  CV amaya and a catheter directed thrombolysis was attempted for acute submassive PE and acute cor pulmonale; however, it was unsuccessful. The patient continued to improve on Lovenox and was switched to Eliquis 5 mg po bid at the time of discharge.        Interim history:   She presents today to discuss her symptoms, labs and further recommendations.  She had hypercoagulable workup done and it is consistent with heterozygous factor 5 Leiden mutation.  She has remained on Apixaban since her last visit without any bruising, bleeding or other complications.  She denies any worsening shortness of breath.  She reports intermittent lower extremity swelling.  She denies any calf tenderness.     She is in need of a new primary care physician and requested her antihypertensives and thyroid medications to be refilled today.      Oncology History:  Oncology History    No history exists.      Past medical, surgical, family, and social history were reviewed today and there are no changes of note unless mentioned in HPI.   MEDS and ALLERGIES were reviewed with patient and meds reconciled.     History:  Past Medical History:   Diagnosis Date    Arthritis     DVT (deep venous thrombosis)     Factor V Leiden mutation 10/23/2020    Hypertension     Thyroid disease       Past Surgical History:   Procedure Laterality Date    CHOLECYSTECTOMY       Family History   Problem Relation Age of Onset    Heart attack Mother     Rheum arthritis Father     Cancer Father     Heart attack Paternal  Uncle     Colon cancer Paternal Grandmother     Heart attack Maternal Uncle       Social History     Tobacco Use    Smoking status: Never Smoker    Smokeless tobacco: Never Used   Substance and Sexual Activity    Alcohol use: Yes    Drug use: Never    Sexual activity: Not on file        ROS:  Review of Systems   Constitutional: Negative for fever, malaise/fatigue and weight loss.   HENT: Negative for congestion, hearing loss, nosebleeds and sore throat.    Eyes: Negative for double vision and photophobia.   Respiratory: Negative for cough, hemoptysis, sputum production, shortness of breath and wheezing.    Cardiovascular: Positive for leg swelling. Negative for chest pain, palpitations and orthopnea.   Gastrointestinal: Negative for abdominal pain, blood in stool, constipation, diarrhea, heartburn, nausea and vomiting.   Genitourinary: Negative for dysuria, hematuria and urgency.   Musculoskeletal: Positive for joint pain. Negative for back pain and myalgias.   Skin: Negative for itching and rash.   Neurological: Negative for dizziness, tingling, seizures, weakness and headaches.   Endo/Heme/Allergies: Negative for polydipsia. Does not bruise/bleed easily.   Psychiatric/Behavioral: Negative for depression and memory loss. The patient is not nervous/anxious and does not have insomnia.        Objective:   There were no vitals filed for this visit.  Wt Readings from Last 10 Encounters:   07/24/20 111.6 kg (246 lb 0.5 oz)   06/02/20 108.4 kg (239 lb)       Physical Examination:   Constitutional: she is alert, pleasant, and does not appear to be in any physical distress   HENT: Mouth/Throat:  Tongue is midline without evidence of glossitis  Eyes: No obvious jaundice or conjunctivitis.  EOM are normal.   Pulmonary/Chest: No stridor noted. No excess chest muscle movement.  Neurological: she is alert and oriented to person, place, and time. No cranial nerve deficit.  Skin:  No rash noted. No erythema.   Psychiatric:  she has a normal mood and affect. Speech and memory normal.     Diagnostic Tests:  Significant Imaging: I have reviewed and interpreted all pertinent imaging results/findings.    Laboratory Data:  All pertinent labs have been reviewed.    Labs:   Lab Results   Component Value Date    WBC 10.41 07/24/2020    RBC 4.33 07/24/2020    HGB 13.3 07/24/2020    HCT 41.1 07/24/2020    MCV 95 07/24/2020     07/24/2020     (H) 07/24/2020     07/24/2020    K 4.5 07/24/2020    BUN 19 (H) 07/24/2020    CREATININE 0.82 07/24/2020    AST 36 07/24/2020    ALT 20 07/24/2020    BILITOT 0.6 07/24/2020     Assessment/Plan:   Acute deep vein thrombosis (DVT) of proximal vein of both lower extremities  Acute pulmonary embolism with acute cor pulmonale, unspecified pulmonary embolism type  Personal history of pulmonary embolism  Factor V Leiden mutation  Given her history and heterozygous status for factor 5 Leiden mutation, she is a candidate for long-term anticoagulation.  Continue apixaban 5 mg p.o. b.i.d..  I will see her back in 6 months or earlier in case of any concerns.    Long term (current) use of anticoagulants  Continue apixaban.    Hyperthyroidism  Continue methimazole.  Follow-up with primary care physician.    Essential hypertension  -     bisoprolol-hydrochlorothiazide 5-6.25 mg (ZIAC) 5-6.25 mg Tab; Take 1 tablet by mouth once daily.  Dispense: 30 tablet; Refill: 11  -     captopriL (CAPOTEN) 12.5 MG tablet; Take 1 tablet (12.5 mg total) by mouth 3 (three) times daily.  Dispense: 270 tablet; Refill: 3       ECOG SCORE    1 - Restricted in strenuous activity-ambulatory and able to carry out work of a light nature       Discussion:   Follow up in about 6 months (around 4/23/2021) for MD.    Plan was discussed with the patient at length, and she verbalized understanding. Francia was given an opportunity to ask questions that were answered to her satisfaction, and she was advised to call in the interval if  any problems or questions arise.  Discussed COVID-19 and social distancing in great detail, avoid all non-essential visits out of the home if possible and avoid sick contacts.     Electronically signed by Kim Romo MD

## 2020-12-05 ENCOUNTER — HOSPITAL ENCOUNTER (EMERGENCY)
Facility: HOSPITAL | Age: 55
Discharge: HOME | End: 2020-12-05
Attending: EMERGENCY MEDICINE
Payer: COMMERCIAL

## 2020-12-05 VITALS
SYSTOLIC BLOOD PRESSURE: 152 MMHG | RESPIRATION RATE: 16 BRPM | WEIGHT: 249 LBS | BODY MASS INDEX: 42.51 KG/M2 | TEMPERATURE: 99 F | DIASTOLIC BLOOD PRESSURE: 72 MMHG | OXYGEN SATURATION: 100 % | HEIGHT: 64 IN | HEART RATE: 58 BPM

## 2020-12-05 DIAGNOSIS — M25.571 ANKLE PAIN, RIGHT: Primary | ICD-10-CM

## 2020-12-05 PROCEDURE — 99284 EMERGENCY DEPT VISIT MOD MDM: CPT | Mod: ,,, | Performed by: EMERGENCY MEDICINE

## 2020-12-05 PROCEDURE — 25000003 PHARM REV CODE 250: Performed by: EMERGENCY MEDICINE

## 2020-12-05 PROCEDURE — 99284 PR EMERGENCY DEPT VISIT,LEVEL IV: ICD-10-PCS | Mod: ,,, | Performed by: EMERGENCY MEDICINE

## 2020-12-05 PROCEDURE — 99284 EMERGENCY DEPT VISIT MOD MDM: CPT | Mod: 25

## 2020-12-05 RX ORDER — NAPROXEN 500 MG/1
500 TABLET ORAL
Status: COMPLETED | OUTPATIENT
Start: 2020-12-05 | End: 2020-12-05

## 2020-12-05 RX ORDER — COLCHICINE 0.6 MG/1
0.6 TABLET, FILM COATED ORAL DAILY
COMMUNITY
End: 2023-07-13

## 2020-12-05 RX ADMIN — NAPROXEN 500 MG: 500 TABLET ORAL at 03:12

## 2020-12-05 NOTE — ED NOTES
LOC: The patient is awake and alert; oriented x 3 and speaking appropriately.  APPEARANCE: Patient resting comfortably, patient is clean and well groomed  SKIN: warm and dry, normal skin turgor & moist mucus membranes, skin intact, no breakdown noted. Abrasion to rt elbow.  MUSCULOSKELETAL: Patient moving all extremities well, no obvious swelling or deformities noted. pain in rt ankle  and rt elbow. RESPIRATORY: Airway is open and patent, respirations are spontaneous, normal effort and rate  CARDIAC: Patient has a normal rate, no peripheral edema noted, capillary refill < 3 seconds; No complaints of chest pain   ABDOMEN: Soft and non tender to palpation, no distention noted.

## 2020-12-05 NOTE — ED PROVIDER NOTES
Encounter Date: 12/5/2020    SCRIBE #1 NOTE: I, Charan Cox, am scribing for, and in the presence of,  Ashwin Preciado MD. I have scribed the following portions of the note - Other sections scribed: HPI, ROS, PE.       History     Chief Complaint   Patient presents with    Ankle Pain     twisted right ankle with trip and fall.  + pain and swelling.  Pt on Eloquis but did not hit head     Time patient was seen by the provider: 2:01 PM    The patient is a 55 y.o. female with past medical history of DVT, HTN, thyroid disease, and arthritis who presents to the ED with a complaint of right ankle s/p fall. The patient reports that she internally rotated her right ankle while at work. She reports that she was able to bear weight after the fall and was able to ambulate through the pain. Patient reports that she can wiggle her toes and has sensation in her foot. Pain improves when she changes her shoes. Patient reports that she has been taking tylenol for her pain. She reports being on blood thinner medication. Denies diarrhea, fever, chills, numbness, vomiting, nausea, dysuria, shortness of breath, fatigue, abdominal pain, and chest pain.    The history is provided by the patient and medical records.     Review of patient's allergies indicates:  No Known Allergies  Past Medical History:   Diagnosis Date    Arthritis     DVT (deep venous thrombosis)     Factor V Leiden mutation 10/23/2020    Hypertension     Pulmonary embolism     Shingles     Thyroid disease      Past Surgical History:   Procedure Laterality Date    CHOLECYSTECTOMY       Family History   Problem Relation Age of Onset    Heart attack Mother     Rheum arthritis Father     Cancer Father     Heart attack Paternal Uncle     Colon cancer Paternal Grandmother     Heart attack Maternal Uncle      Social History     Tobacco Use    Smoking status: Never Smoker    Smokeless tobacco: Never Used   Substance Use Topics    Alcohol use: Yes      "Comment: occassionally    Drug use: Never     Review of Systems  CONST: No fever, chills, or fatigue  HEENT: No headache, sore throat, or voice changes  HEART: No pain  LUNG: No SOB, cough, or other complaints  ABDOMEN: No pain, nausea, vomiting, diarrhea, constipation, or flank pain  : No discharge, dysuria, lesions, rashes, masses, sores  EXTREMITIES:Positive for right ankle pain. NO numbness  SKIN: No lesions, skin tears, rashes, trauma or other complaints    Physical Exam     Initial Vitals [12/05/20 1329]   BP Pulse Resp Temp SpO2   (!) 192/100 72 16 99 °F (37.2 °C) 99 %      MAP       --         Vitals:    12/05/20 1329 12/05/20 1534   BP: (!) 192/100 (!) 152/72   Pulse: 72 (!) 58   Resp: 16    Temp: 99 °F (37.2 °C)    TempSrc: Oral    SpO2: 99% 100%   Weight: 112.9 kg (249 lb)    Height: 5' 4" (1.626 m)    \    Physical Exam  GENERAL: Calm; Cooperative; Well-appearing and Non-Toxic; Overweight; NAD.  HEENT: AT/NC; speaking full sentences with no slurring of speech or drooling/inability to tolerate oral secretions.  NECK: Supple, FROM and atraumatic  THORAX/BACK: Atraumatic  HEART: Regular rate and rhythm, no M/G/T.  LUNGS: No Tachypnea, No Retractions, and CTA B/L with no W/R/R.  ABDOMEN: +BS, Soft, ND, NTTP.   EXTREMITIES: FROM. Strength 5/5. Symmetrical Sensorium and with no deficits. Soft Comparments. Good sensation intact. Good pulses. No gross deformity. Point tenderness over ATFL.  SKIN: Warm, Dry, No Skin Tears or Rashes.  VASCULAR: 2+ pulses Prox/Dist & Symmetrical with No delay.  NEUROLOGIC: AAOx3; answering questions appropriately with no focal peripheral neurologic sensory or motor deficits to lower extremity distal to ankle joint.    ED Course   Procedures  Labs Reviewed - No data to display       Imaging Results          X-Ray Tibia Fibula 2 View Right (Final result)  Result time 12/05/20 15:33:26    Final result by Erik Archibald MD (12/05/20 15:33:26)                 Impression:    "   Suspected nonspecific soft tissue swelling about the ankle, greatest laterally without convincing evidence for acute displaced fracture-dislocation noting suspected accessory ossicles or sequela of remote trauma with nonunion adjacent to the medial and lateral malleolar tips.  Correlate for point tenderness at these regions.      Electronically signed by: Erik Archibald MD  Date:    12/05/2020  Time:    15:33             Narrative:    EXAMINATION:  XR ANKLE COMPLETE 3 VIEW RIGHT; XR TIBIA FIBULA 2 VIEW RIGHT    CLINICAL HISTORY:  Pain in right ankle and joints of right foot    TECHNIQUE:  AP, lateral, and oblique images of the right ankle; AP and lateral views right tibia    COMPARISON:  None    FINDINGS:  Bones appear well mineralized.  Prominence of the soft tissues about the ankle, greatest laterally suggesting nonspecific swelling.  Small well corticated ossific bodies adjacent to the medial and also lateral malleolar tips suggesting sequela of remote trauma with nonunion versus accessory ossicles.  Ankle mortise is otherwise well aligned and appears intact aside from mild degenerative change.  Mild degenerative change at the imaged knee.  No evidence of acute displaced fracture, dislocation or destructive osseous process.  Prominent enthesophyte at the Achilles insertion site.  No subcutaneous emphysema or radiodense retained foreign body.                               X-Ray Ankle Complete Right (Final result)  Result time 12/05/20 15:33:26    Final result by Erik Archibald MD (12/05/20 15:33:26)                 Impression:      Suspected nonspecific soft tissue swelling about the ankle, greatest laterally without convincing evidence for acute displaced fracture-dislocation noting suspected accessory ossicles or sequela of remote trauma with nonunion adjacent to the medial and lateral malleolar tips.  Correlate for point tenderness at these regions.      Electronically signed by: Erik Archibald  MD  Date:    12/05/2020  Time:    15:33             Narrative:    EXAMINATION:  XR ANKLE COMPLETE 3 VIEW RIGHT; XR TIBIA FIBULA 2 VIEW RIGHT    CLINICAL HISTORY:  Pain in right ankle and joints of right foot    TECHNIQUE:  AP, lateral, and oblique images of the right ankle; AP and lateral views right tibia    COMPARISON:  None    FINDINGS:  Bones appear well mineralized.  Prominence of the soft tissues about the ankle, greatest laterally suggesting nonspecific swelling.  Small well corticated ossific bodies adjacent to the medial and also lateral malleolar tips suggesting sequela of remote trauma with nonunion versus accessory ossicles.  Ankle mortise is otherwise well aligned and appears intact aside from mild degenerative change.  Mild degenerative change at the imaged knee.  No evidence of acute displaced fracture, dislocation or destructive osseous process.  Prominent enthesophyte at the Achilles insertion site.  No subcutaneous emphysema or radiodense retained foreign body.                                 Medical Decision Making:   History:   Old Medical Records: I decided to obtain old medical records.  Old Records Summarized: records from clinic visits.  Clinical Tests:   Radiological Study: Ordered and Reviewed            Scribe Attestation:   Scribe #1: I performed the above scribed service and the documentation accurately describes the services I performed. I attest to the accuracy of the note.    STAFF ATTENDING PHYSICIAN NOTE:  I provided and agree with the documentation provided by ALEXI on Francia Rod.  ____________________  Sylvester Preciado MD, [facility]  Emergency Medicine Staff                    Clinical Impression:     ICD-10-CM ICD-9-CM   1. Ankle pain, right  M25.571 719.47                      Disposition:   Disposition: Discharged  Condition: Stable     ED Disposition Condition    Discharge Stable        ED Prescriptions     Over-the-counter acetaminophen versus NSAIDs as tolerated p.r.n..         Follow-up Information     Follow up With Specialties Details Why Contact Info    [facility] Emergency Medicine  If symptoms worsen [address] [facility] [address] [phone]                                       Ashwin Preciado MD  12/06/20 0815

## 2020-12-05 NOTE — ED TRIAGE NOTES
Fell 2 hrs PTA  Walking out a door and missed testep.  Pain in rt ankle. , swollen. Painful to bear weight. Struck rt elbow- has abrasion.  Denies striking head or LOC.

## 2020-12-05 NOTE — DISCHARGE INSTRUCTIONS
X-Ray Tibia Fibula 2 View Right (Final result)  Result time 12/05/20 15:33:26  Final result by Erik Archibald MD (12/05/20 15:33:26)                Impression:      Suspected nonspecific soft tissue swelling about the ankle, greatest laterally without convincing evidence for acute displaced fracture-dislocation noting suspected accessory ossicles or sequela of remote trauma with nonunion adjacent to the medial and lateral malleolar tips.  Correlate for point tenderness at these regions.       Electronically signed by: Erik Archibald MD   Date: 12/05/2020   Time: 15:33            Narrative:    EXAMINATION:   XR ANKLE COMPLETE 3 VIEW RIGHT; XR TIBIA FIBULA 2 VIEW RIGHT     CLINICAL HISTORY:   Pain in right ankle and joints of right foot     TECHNIQUE:   AP, lateral, and oblique images of the right ankle; AP and lateral views right tibia     COMPARISON:   None     FINDINGS:   Bones appear well mineralized.  Prominence of the soft tissues about the ankle, greatest laterally suggesting nonspecific swelling.  Small well corticated ossific bodies adjacent to the medial and also lateral malleolar tips suggesting sequela of remote trauma with nonunion versus accessory ossicles.  Ankle mortise is otherwise well aligned and appears intact aside from mild degenerative change.  Mild degenerative change at the imaged knee.  No evidence of acute displaced fracture, dislocation or destructive osseous process.  Prominent enthesophyte at the Achilles insertion site.  No subcutaneous emphysema or radiodense retained foreign body.                     X-Ray Ankle Complete Right (Final result)  Result time 12/05/20 15:33:26  Final result by Erik Archibald MD (12/05/20 15:33:26)                Impression:      Suspected nonspecific soft tissue swelling about the ankle, greatest laterally without convincing evidence for acute displaced fracture-dislocation noting suspected accessory ossicles or sequela of remote trauma with nonunion  adjacent to the medial and lateral malleolar tips.  Correlate for point tenderness at these regions.       Electronically signed by: Erik Archibald MD   Date: 12/05/2020   Time: 15:33            Narrative:    EXAMINATION:   XR ANKLE COMPLETE 3 VIEW RIGHT; XR TIBIA FIBULA 2 VIEW RIGHT     CLINICAL HISTORY:   Pain in right ankle and joints of right foot     TECHNIQUE:   AP, lateral, and oblique images of the right ankle; AP and lateral views right tibia     COMPARISON:   None     FINDINGS:   Bones appear well mineralized.  Prominence of the soft tissues about the ankle, greatest laterally suggesting nonspecific swelling.  Small well corticated ossific bodies adjacent to the medial and also lateral malleolar tips suggesting sequela of remote trauma with nonunion versus accessory ossicles.  Ankle mortise is otherwise well aligned and appears intact aside from mild degenerative change.  Mild degenerative change at the imaged knee.  No evidence of acute displaced fracture, dislocation or destructive osseous process.  Prominent enthesophyte at the Achilles insertion site.  No subcutaneous emphysema or radiodense retained foreign body.

## 2021-02-08 ENCOUNTER — TELEPHONE (OUTPATIENT)
Dept: HEMATOLOGY/ONCOLOGY | Facility: CLINIC | Age: 56
End: 2021-02-08

## 2021-02-25 ENCOUNTER — PATIENT MESSAGE (OUTPATIENT)
Dept: HEMATOLOGY/ONCOLOGY | Facility: CLINIC | Age: 56
End: 2021-02-25

## 2021-04-13 ENCOUNTER — PATIENT MESSAGE (OUTPATIENT)
Dept: HEMATOLOGY/ONCOLOGY | Facility: CLINIC | Age: 56
End: 2021-04-13

## 2021-04-13 ENCOUNTER — TELEPHONE (OUTPATIENT)
Dept: HEMATOLOGY/ONCOLOGY | Facility: CLINIC | Age: 56
End: 2021-04-13

## 2021-05-14 ENCOUNTER — TELEPHONE (OUTPATIENT)
Dept: HEMATOLOGY/ONCOLOGY | Facility: CLINIC | Age: 56
End: 2021-05-14

## 2021-05-14 ENCOUNTER — OFFICE VISIT (OUTPATIENT)
Dept: HEMATOLOGY/ONCOLOGY | Facility: CLINIC | Age: 56
End: 2021-05-14
Payer: COMMERCIAL

## 2021-05-14 ENCOUNTER — PATIENT MESSAGE (OUTPATIENT)
Dept: HEMATOLOGY/ONCOLOGY | Facility: CLINIC | Age: 56
End: 2021-05-14

## 2021-05-14 DIAGNOSIS — I26.09 ACUTE PULMONARY EMBOLISM WITH ACUTE COR PULMONALE, UNSPECIFIED PULMONARY EMBOLISM TYPE: Primary | ICD-10-CM

## 2021-05-14 DIAGNOSIS — I82.4Y3 ACUTE DEEP VEIN THROMBOSIS (DVT) OF PROXIMAL VEIN OF BOTH LOWER EXTREMITIES: ICD-10-CM

## 2021-05-14 DIAGNOSIS — M06.9 RHEUMATOID ARTHRITIS, INVOLVING UNSPECIFIED SITE, UNSPECIFIED WHETHER RHEUMATOID FACTOR PRESENT: ICD-10-CM

## 2021-05-14 DIAGNOSIS — I10 ESSENTIAL HYPERTENSION: ICD-10-CM

## 2021-05-14 DIAGNOSIS — Z79.01 LONG TERM (CURRENT) USE OF ANTICOAGULANTS: ICD-10-CM

## 2021-05-14 DIAGNOSIS — E66.9 OBESITY (BMI 35.0-39.9 WITHOUT COMORBIDITY): ICD-10-CM

## 2021-05-14 DIAGNOSIS — D68.51 FACTOR V LEIDEN MUTATION: ICD-10-CM

## 2021-05-14 PROCEDURE — 99213 OFFICE O/P EST LOW 20 MIN: CPT | Mod: 95,,, | Performed by: INTERNAL MEDICINE

## 2021-05-14 PROCEDURE — 99213 PR OFFICE/OUTPT VISIT, EST, LEVL III, 20-29 MIN: ICD-10-PCS | Mod: 95,,, | Performed by: INTERNAL MEDICINE

## 2021-08-19 ENCOUNTER — PATIENT MESSAGE (OUTPATIENT)
Dept: BARIATRICS | Facility: CLINIC | Age: 56
End: 2021-08-19

## 2021-09-24 ENCOUNTER — TELEPHONE (OUTPATIENT)
Dept: HEMATOLOGY/ONCOLOGY | Facility: CLINIC | Age: 56
End: 2021-09-24

## 2021-09-30 ENCOUNTER — TELEPHONE (OUTPATIENT)
Dept: HEMATOLOGY/ONCOLOGY | Facility: CLINIC | Age: 56
End: 2021-09-30

## 2021-10-01 ENCOUNTER — TELEPHONE (OUTPATIENT)
Dept: HEMATOLOGY/ONCOLOGY | Facility: CLINIC | Age: 56
End: 2021-10-01

## 2021-10-01 ENCOUNTER — PATIENT MESSAGE (OUTPATIENT)
Dept: HEMATOLOGY/ONCOLOGY | Facility: CLINIC | Age: 56
End: 2021-10-01

## 2021-11-12 ENCOUNTER — PATIENT MESSAGE (OUTPATIENT)
Dept: HEMATOLOGY/ONCOLOGY | Facility: CLINIC | Age: 56
End: 2021-11-12
Payer: COMMERCIAL

## 2021-11-19 ENCOUNTER — LAB VISIT (OUTPATIENT)
Dept: LAB | Facility: HOSPITAL | Age: 56
End: 2021-11-19
Attending: INTERNAL MEDICINE
Payer: COMMERCIAL

## 2021-11-19 ENCOUNTER — OFFICE VISIT (OUTPATIENT)
Dept: HEMATOLOGY/ONCOLOGY | Facility: CLINIC | Age: 56
End: 2021-11-19
Payer: COMMERCIAL

## 2021-11-19 VITALS
DIASTOLIC BLOOD PRESSURE: 90 MMHG | HEART RATE: 77 BPM | HEIGHT: 64 IN | RESPIRATION RATE: 18 BRPM | BODY MASS INDEX: 42.01 KG/M2 | TEMPERATURE: 98 F | SYSTOLIC BLOOD PRESSURE: 140 MMHG | OXYGEN SATURATION: 98 % | WEIGHT: 246.06 LBS

## 2021-11-19 DIAGNOSIS — I26.09 ACUTE PULMONARY EMBOLISM WITH ACUTE COR PULMONALE, UNSPECIFIED PULMONARY EMBOLISM TYPE: ICD-10-CM

## 2021-11-19 DIAGNOSIS — I82.4Y3 ACUTE DEEP VEIN THROMBOSIS (DVT) OF PROXIMAL VEIN OF BOTH LOWER EXTREMITIES: ICD-10-CM

## 2021-11-19 DIAGNOSIS — I82.4Y3 ACUTE DEEP VEIN THROMBOSIS (DVT) OF PROXIMAL VEIN OF BOTH LOWER EXTREMITIES: Primary | ICD-10-CM

## 2021-11-19 DIAGNOSIS — Z79.01 LONG TERM (CURRENT) USE OF ANTICOAGULANTS: ICD-10-CM

## 2021-11-19 DIAGNOSIS — D68.51 FACTOR V LEIDEN MUTATION: ICD-10-CM

## 2021-11-19 DIAGNOSIS — Z86.711 PERSONAL HISTORY OF PULMONARY EMBOLISM: ICD-10-CM

## 2021-11-19 LAB
ALBUMIN SERPL BCP-MCNC: 3.8 G/DL (ref 3.5–5.2)
ALP SERPL-CCNC: 111 U/L (ref 55–135)
ALT SERPL W/O P-5'-P-CCNC: 19 U/L (ref 10–44)
ANION GAP SERPL CALC-SCNC: 14 MMOL/L (ref 8–16)
AST SERPL-CCNC: 19 U/L (ref 10–40)
BASOPHILS # BLD AUTO: 0.06 K/UL (ref 0–0.2)
BASOPHILS NFR BLD: 0.7 % (ref 0–1.9)
BILIRUB SERPL-MCNC: 0.6 MG/DL (ref 0.1–1)
BUN SERPL-MCNC: 17 MG/DL (ref 6–20)
CALCIUM SERPL-MCNC: 9.6 MG/DL (ref 8.7–10.5)
CHLORIDE SERPL-SCNC: 100 MMOL/L (ref 95–110)
CO2 SERPL-SCNC: 24 MMOL/L (ref 23–29)
CREAT SERPL-MCNC: 0.9 MG/DL (ref 0.5–1.4)
DIFFERENTIAL METHOD: ABNORMAL
EOSINOPHIL # BLD AUTO: 0.1 K/UL (ref 0–0.5)
EOSINOPHIL NFR BLD: 1.3 % (ref 0–8)
ERYTHROCYTE [DISTWIDTH] IN BLOOD BY AUTOMATED COUNT: 14.6 % (ref 11.5–14.5)
EST. GFR  (AFRICAN AMERICAN): >60 ML/MIN/1.73 M^2
EST. GFR  (NON AFRICAN AMERICAN): >60 ML/MIN/1.73 M^2
GLUCOSE SERPL-MCNC: 108 MG/DL (ref 70–110)
HCT VFR BLD AUTO: 38.7 % (ref 37–48.5)
HGB BLD-MCNC: 12.6 G/DL (ref 12–16)
IMM GRANULOCYTES # BLD AUTO: 0.04 K/UL (ref 0–0.04)
IMM GRANULOCYTES NFR BLD AUTO: 0.4 % (ref 0–0.5)
LYMPHOCYTES # BLD AUTO: 2.1 K/UL (ref 1–4.8)
LYMPHOCYTES NFR BLD: 22.6 % (ref 18–48)
MCH RBC QN AUTO: 30.2 PG (ref 27–31)
MCHC RBC AUTO-ENTMCNC: 32.6 G/DL (ref 32–36)
MCV RBC AUTO: 93 FL (ref 82–98)
MONOCYTES # BLD AUTO: 0.7 K/UL (ref 0.3–1)
MONOCYTES NFR BLD: 8 % (ref 4–15)
NEUTROPHILS # BLD AUTO: 6.2 K/UL (ref 1.8–7.7)
NEUTROPHILS NFR BLD: 67 % (ref 38–73)
NRBC BLD-RTO: 0 /100 WBC
PLATELET # BLD AUTO: 322 K/UL (ref 150–450)
PMV BLD AUTO: 11 FL (ref 9.2–12.9)
POTASSIUM SERPL-SCNC: 3.8 MMOL/L (ref 3.5–5.1)
PROT SERPL-MCNC: 8.2 G/DL (ref 6–8.4)
RBC # BLD AUTO: 4.17 M/UL (ref 4–5.4)
SODIUM SERPL-SCNC: 138 MMOL/L (ref 136–145)
WBC # BLD AUTO: 9.2 K/UL (ref 3.9–12.7)

## 2021-11-19 PROCEDURE — 99213 PR OFFICE/OUTPT VISIT, EST, LEVL III, 20-29 MIN: ICD-10-PCS | Mod: S$GLB,,, | Performed by: INTERNAL MEDICINE

## 2021-11-19 PROCEDURE — 1159F PR MEDICATION LIST DOCUMENTED IN MEDICAL RECORD: ICD-10-PCS | Mod: CPTII,S$GLB,, | Performed by: INTERNAL MEDICINE

## 2021-11-19 PROCEDURE — 1159F MED LIST DOCD IN RCRD: CPT | Mod: CPTII,S$GLB,, | Performed by: INTERNAL MEDICINE

## 2021-11-19 PROCEDURE — 3080F PR MOST RECENT DIASTOLIC BLOOD PRESSURE >= 90 MM HG: ICD-10-PCS | Mod: CPTII,S$GLB,, | Performed by: INTERNAL MEDICINE

## 2021-11-19 PROCEDURE — 99213 OFFICE O/P EST LOW 20 MIN: CPT | Mod: S$GLB,,, | Performed by: INTERNAL MEDICINE

## 2021-11-19 PROCEDURE — 99999 PR PBB SHADOW E&M-EST. PATIENT-LVL III: ICD-10-PCS | Mod: PBBFAC,,, | Performed by: INTERNAL MEDICINE

## 2021-11-19 PROCEDURE — 99999 PR PBB SHADOW E&M-EST. PATIENT-LVL III: CPT | Mod: PBBFAC,,, | Performed by: INTERNAL MEDICINE

## 2021-11-19 PROCEDURE — 3077F PR MOST RECENT SYSTOLIC BLOOD PRESSURE >= 140 MM HG: ICD-10-PCS | Mod: CPTII,S$GLB,, | Performed by: INTERNAL MEDICINE

## 2021-11-19 PROCEDURE — 3008F BODY MASS INDEX DOCD: CPT | Mod: CPTII,S$GLB,, | Performed by: INTERNAL MEDICINE

## 2021-11-19 PROCEDURE — 1160F PR REVIEW ALL MEDS BY PRESCRIBER/CLIN PHARMACIST DOCUMENTED: ICD-10-PCS | Mod: CPTII,S$GLB,, | Performed by: INTERNAL MEDICINE

## 2021-11-19 PROCEDURE — 85025 COMPLETE CBC W/AUTO DIFF WBC: CPT | Mod: PN | Performed by: INTERNAL MEDICINE

## 2021-11-19 PROCEDURE — 3080F DIAST BP >= 90 MM HG: CPT | Mod: CPTII,S$GLB,, | Performed by: INTERNAL MEDICINE

## 2021-11-19 PROCEDURE — 80053 COMPREHEN METABOLIC PANEL: CPT | Mod: PN | Performed by: INTERNAL MEDICINE

## 2021-11-19 PROCEDURE — 36415 COLL VENOUS BLD VENIPUNCTURE: CPT | Mod: PN | Performed by: INTERNAL MEDICINE

## 2021-11-19 PROCEDURE — 3008F PR BODY MASS INDEX (BMI) DOCUMENTED: ICD-10-PCS | Mod: CPTII,S$GLB,, | Performed by: INTERNAL MEDICINE

## 2021-11-19 PROCEDURE — 3077F SYST BP >= 140 MM HG: CPT | Mod: CPTII,S$GLB,, | Performed by: INTERNAL MEDICINE

## 2021-11-19 PROCEDURE — 1160F RVW MEDS BY RX/DR IN RCRD: CPT | Mod: CPTII,S$GLB,, | Performed by: INTERNAL MEDICINE

## 2021-11-19 PROCEDURE — 4010F ACE/ARB THERAPY RXD/TAKEN: CPT | Mod: CPTII,S$GLB,, | Performed by: INTERNAL MEDICINE

## 2021-11-19 PROCEDURE — 4010F PR ACE/ARB THEARPY RXD/TAKEN: ICD-10-PCS | Mod: CPTII,S$GLB,, | Performed by: INTERNAL MEDICINE

## 2021-11-19 RX ORDER — FOLIC ACID 1 MG/1
1000 TABLET ORAL DAILY
COMMUNITY
Start: 2021-09-20 | End: 2023-07-13

## 2021-11-19 RX ORDER — CELECOXIB 200 MG/1
200 CAPSULE ORAL DAILY
COMMUNITY
Start: 2021-06-23 | End: 2023-07-13

## 2021-11-19 RX ORDER — AMLODIPINE BESYLATE 10 MG/1
10 TABLET ORAL DAILY
COMMUNITY
Start: 2021-10-13

## 2022-01-18 ENCOUNTER — PATIENT MESSAGE (OUTPATIENT)
Dept: HEMATOLOGY/ONCOLOGY | Facility: CLINIC | Age: 57
End: 2022-01-18
Payer: COMMERCIAL

## 2022-03-08 ENCOUNTER — PATIENT MESSAGE (OUTPATIENT)
Dept: INFUSION THERAPY | Facility: HOSPITAL | Age: 57
End: 2022-03-08
Payer: COMMERCIAL

## 2022-05-04 ENCOUNTER — PATIENT MESSAGE (OUTPATIENT)
Dept: HEMATOLOGY/ONCOLOGY | Facility: CLINIC | Age: 57
End: 2022-05-04
Payer: COMMERCIAL

## 2022-05-04 ENCOUNTER — TELEPHONE (OUTPATIENT)
Dept: HEMATOLOGY/ONCOLOGY | Facility: CLINIC | Age: 57
End: 2022-05-04
Payer: COMMERCIAL

## 2022-05-05 ENCOUNTER — TELEPHONE (OUTPATIENT)
Dept: HEMATOLOGY/ONCOLOGY | Facility: CLINIC | Age: 57
End: 2022-05-05
Payer: COMMERCIAL

## 2022-05-05 NOTE — TELEPHONE ENCOUNTER
----- Message from Yuli Thomas sent at 5/5/2022 10:58 AM CDT -----  Regarding: Dr Romo  Type:Needs Medical Advice    Who Called:PT  Best call back number:[phone]  Additional Info: Requesting a call back regarding#PT would like to reschedule her appt from Monday 5/9. Please call to reschedule as soon as possible.  Please Advise- Thank you

## 2022-05-05 NOTE — TELEPHONE ENCOUNTER
Spoke with pt to get appts rescheduled pt asked to be moved to the beginning of June and accepted appt on June 9th and verbalized understanding

## 2022-06-09 ENCOUNTER — OFFICE VISIT (OUTPATIENT)
Dept: HEMATOLOGY/ONCOLOGY | Facility: CLINIC | Age: 57
End: 2022-06-09
Payer: COMMERCIAL

## 2022-06-09 ENCOUNTER — PATIENT MESSAGE (OUTPATIENT)
Dept: HEMATOLOGY/ONCOLOGY | Facility: CLINIC | Age: 57
End: 2022-06-09

## 2022-06-09 ENCOUNTER — LAB VISIT (OUTPATIENT)
Dept: LAB | Facility: HOSPITAL | Age: 57
End: 2022-06-09
Attending: INTERNAL MEDICINE
Payer: COMMERCIAL

## 2022-06-09 VITALS
OXYGEN SATURATION: 98 % | TEMPERATURE: 97 F | HEIGHT: 64 IN | WEIGHT: 250.44 LBS | SYSTOLIC BLOOD PRESSURE: 167 MMHG | BODY MASS INDEX: 42.76 KG/M2 | RESPIRATION RATE: 16 BRPM | HEART RATE: 66 BPM | DIASTOLIC BLOOD PRESSURE: 78 MMHG

## 2022-06-09 DIAGNOSIS — I82.4Y3 ACUTE DEEP VEIN THROMBOSIS (DVT) OF PROXIMAL VEIN OF BOTH LOWER EXTREMITIES: Primary | ICD-10-CM

## 2022-06-09 DIAGNOSIS — I82.4Y3 ACUTE DEEP VEIN THROMBOSIS (DVT) OF PROXIMAL VEIN OF BOTH LOWER EXTREMITIES: ICD-10-CM

## 2022-06-09 DIAGNOSIS — Z79.01 LONG TERM (CURRENT) USE OF ANTICOAGULANTS: ICD-10-CM

## 2022-06-09 DIAGNOSIS — I26.09 ACUTE PULMONARY EMBOLISM WITH ACUTE COR PULMONALE, UNSPECIFIED PULMONARY EMBOLISM TYPE: ICD-10-CM

## 2022-06-09 DIAGNOSIS — D68.51 FACTOR V LEIDEN MUTATION: ICD-10-CM

## 2022-06-09 LAB
ALBUMIN SERPL BCP-MCNC: 3.5 G/DL (ref 3.5–5.2)
ALP SERPL-CCNC: 104 U/L (ref 55–135)
ALT SERPL W/O P-5'-P-CCNC: 20 U/L (ref 10–44)
ANION GAP SERPL CALC-SCNC: 8 MMOL/L (ref 8–16)
AST SERPL-CCNC: 15 U/L (ref 10–40)
BASOPHILS # BLD AUTO: 0.06 K/UL (ref 0–0.2)
BASOPHILS NFR BLD: 0.6 % (ref 0–1.9)
BILIRUB SERPL-MCNC: 0.3 MG/DL (ref 0.1–1)
BUN SERPL-MCNC: 19 MG/DL (ref 6–20)
CALCIUM SERPL-MCNC: 9.9 MG/DL (ref 8.7–10.5)
CHLORIDE SERPL-SCNC: 98 MMOL/L (ref 95–110)
CO2 SERPL-SCNC: 30 MMOL/L (ref 23–29)
CREAT SERPL-MCNC: 0.9 MG/DL (ref 0.5–1.4)
DIFFERENTIAL METHOD: NORMAL
EOSINOPHIL # BLD AUTO: 0.2 K/UL (ref 0–0.5)
EOSINOPHIL NFR BLD: 1.6 % (ref 0–8)
ERYTHROCYTE [DISTWIDTH] IN BLOOD BY AUTOMATED COUNT: 13 % (ref 11.5–14.5)
EST. GFR  (AFRICAN AMERICAN): >60 ML/MIN/1.73 M^2
EST. GFR  (NON AFRICAN AMERICAN): >60 ML/MIN/1.73 M^2
GLUCOSE SERPL-MCNC: 112 MG/DL (ref 70–110)
HCT VFR BLD AUTO: 40.6 % (ref 37–48.5)
HGB BLD-MCNC: 13.4 G/DL (ref 12–16)
IMM GRANULOCYTES # BLD AUTO: 0.02 K/UL (ref 0–0.04)
IMM GRANULOCYTES NFR BLD AUTO: 0.2 % (ref 0–0.5)
LYMPHOCYTES # BLD AUTO: 2 K/UL (ref 1–4.8)
LYMPHOCYTES NFR BLD: 20.9 % (ref 18–48)
MCH RBC QN AUTO: 30.6 PG (ref 27–31)
MCHC RBC AUTO-ENTMCNC: 33 G/DL (ref 32–36)
MCV RBC AUTO: 93 FL (ref 82–98)
MONOCYTES # BLD AUTO: 0.8 K/UL (ref 0.3–1)
MONOCYTES NFR BLD: 7.7 % (ref 4–15)
NEUTROPHILS # BLD AUTO: 6.7 K/UL (ref 1.8–7.7)
NEUTROPHILS NFR BLD: 69 % (ref 38–73)
NRBC BLD-RTO: 0 /100 WBC
PLATELET # BLD AUTO: 293 K/UL (ref 150–450)
PMV BLD AUTO: 10.8 FL (ref 9.2–12.9)
POTASSIUM SERPL-SCNC: 4.5 MMOL/L (ref 3.5–5.1)
PROT SERPL-MCNC: 8.2 G/DL (ref 6–8.4)
RBC # BLD AUTO: 4.38 M/UL (ref 4–5.4)
SODIUM SERPL-SCNC: 136 MMOL/L (ref 136–145)
WBC # BLD AUTO: 9.77 K/UL (ref 3.9–12.7)

## 2022-06-09 PROCEDURE — 3008F PR BODY MASS INDEX (BMI) DOCUMENTED: ICD-10-PCS | Mod: CPTII,S$GLB,, | Performed by: INTERNAL MEDICINE

## 2022-06-09 PROCEDURE — 4010F PR ACE/ARB THEARPY RXD/TAKEN: ICD-10-PCS | Mod: CPTII,S$GLB,, | Performed by: INTERNAL MEDICINE

## 2022-06-09 PROCEDURE — 99999 PR PBB SHADOW E&M-EST. PATIENT-LVL IV: CPT | Mod: PBBFAC,,, | Performed by: INTERNAL MEDICINE

## 2022-06-09 PROCEDURE — 3077F SYST BP >= 140 MM HG: CPT | Mod: CPTII,S$GLB,, | Performed by: INTERNAL MEDICINE

## 2022-06-09 PROCEDURE — 80053 COMPREHEN METABOLIC PANEL: CPT | Mod: PN | Performed by: INTERNAL MEDICINE

## 2022-06-09 PROCEDURE — 1160F PR REVIEW ALL MEDS BY PRESCRIBER/CLIN PHARMACIST DOCUMENTED: ICD-10-PCS | Mod: CPTII,S$GLB,, | Performed by: INTERNAL MEDICINE

## 2022-06-09 PROCEDURE — 3008F BODY MASS INDEX DOCD: CPT | Mod: CPTII,S$GLB,, | Performed by: INTERNAL MEDICINE

## 2022-06-09 PROCEDURE — 1159F PR MEDICATION LIST DOCUMENTED IN MEDICAL RECORD: ICD-10-PCS | Mod: CPTII,S$GLB,, | Performed by: INTERNAL MEDICINE

## 2022-06-09 PROCEDURE — 3078F PR MOST RECENT DIASTOLIC BLOOD PRESSURE < 80 MM HG: ICD-10-PCS | Mod: CPTII,S$GLB,, | Performed by: INTERNAL MEDICINE

## 2022-06-09 PROCEDURE — 4010F ACE/ARB THERAPY RXD/TAKEN: CPT | Mod: CPTII,S$GLB,, | Performed by: INTERNAL MEDICINE

## 2022-06-09 PROCEDURE — 36415 COLL VENOUS BLD VENIPUNCTURE: CPT | Mod: PN | Performed by: INTERNAL MEDICINE

## 2022-06-09 PROCEDURE — 3077F PR MOST RECENT SYSTOLIC BLOOD PRESSURE >= 140 MM HG: ICD-10-PCS | Mod: CPTII,S$GLB,, | Performed by: INTERNAL MEDICINE

## 2022-06-09 PROCEDURE — 3078F DIAST BP <80 MM HG: CPT | Mod: CPTII,S$GLB,, | Performed by: INTERNAL MEDICINE

## 2022-06-09 PROCEDURE — 85025 COMPLETE CBC W/AUTO DIFF WBC: CPT | Mod: PN | Performed by: INTERNAL MEDICINE

## 2022-06-09 PROCEDURE — 99999 PR PBB SHADOW E&M-EST. PATIENT-LVL IV: ICD-10-PCS | Mod: PBBFAC,,, | Performed by: INTERNAL MEDICINE

## 2022-06-09 PROCEDURE — 99213 PR OFFICE/OUTPT VISIT, EST, LEVL III, 20-29 MIN: ICD-10-PCS | Mod: S$GLB,,, | Performed by: INTERNAL MEDICINE

## 2022-06-09 PROCEDURE — 1159F MED LIST DOCD IN RCRD: CPT | Mod: CPTII,S$GLB,, | Performed by: INTERNAL MEDICINE

## 2022-06-09 PROCEDURE — 1160F RVW MEDS BY RX/DR IN RCRD: CPT | Mod: CPTII,S$GLB,, | Performed by: INTERNAL MEDICINE

## 2022-06-09 PROCEDURE — 99213 OFFICE O/P EST LOW 20 MIN: CPT | Mod: S$GLB,,, | Performed by: INTERNAL MEDICINE

## 2022-06-09 NOTE — PROGRESS NOTES
PROGRESS NOTE    Subjective:       Patient ID: Francia Rod is a 57 y.o. female.  MRN: [id number]  : 1965    Chief Complaint: Acute deep vein thrombosis (DVT) of proximal vein of both l      History of Present Illness:   Francia Rod is a 57 y.o. female who presents with  recurrent DVT and PE.      As previously documented, she was diagnosed with a PE in  when she presented with sudden shortness of breath.  This episode was attributed to OCP use which was then stopped.  She was treated with warfarin for 6 months.      In 2020, she presented to [facility] with bilateral PE and DVT.  She does report a long car ride to and [place].  Three days prior to admission, she developed worsening shortness of breath, substernal chest pain exacerbated with minimal activity as well as a low-grade fever.     A CTA showed several bilateral pulmonary emboli with signs of cardiac strain. Venous ultrasound performed showed DVT involving the left femoral vein, popliteal vein and left posterior tibialis vein and the right posterior tibialis vein. D dimer was elevated to 6.55. BNP was 6923. She was started on full dose lovenox . She tested negative for COVID-19.     She was seen by Dr. MALIK conde and a catheter directed thrombolysis was attempted for acute submassive PE and acute cor pulmonale; however, it was unsuccessful. The patient continued to improve on Lovenox and was switched to Eliquis 5 mg po bid at the time of discharge.       She had hypercoagulable workup done and it is consistent with heterozygous factor 5 Leiden mutation.     Interim history:  She has remained on Apixaban since her last visit without any bruising, bleeding or other complications.  She denies any worsening shortness of breath.   She denies any calf tenderness. She has intermittent leg swellings, has not found the right compression socks.  Found to be hypertensive today, will  check blood pressure again when she gets home.    Is due for a screening colonoscopy and gyn visit , is about to see her new pcp and will get referrals.      Oncology History:  Oncology History    No history exists.       History:  Past Medical History:   Diagnosis Date    Arthritis     DVT (deep venous thrombosis)     Factor V Leiden mutation 10/23/2020    Hypertension     Pulmonary embolism     Shingles     Thyroid disease       Past Surgical History:   Procedure Laterality Date    CHOLECYSTECTOMY       Family History   Problem Relation Age of Onset    Heart attack Mother     Rheum arthritis Father     Cancer Father     Heart attack Paternal Uncle     Colon cancer Paternal Grandmother     Heart attack Maternal Uncle      Social History     Tobacco Use    Smoking status: Never Smoker    Smokeless tobacco: Never Used   Substance and Sexual Activity    Alcohol use: Yes     Comment: occassionally    Drug use: Never    Sexual activity: Not on file        ROS:   Review of Systems   Constitutional: Negative for fever, malaise/fatigue and weight loss.   HENT: Negative for congestion, hearing loss, nosebleeds and sore throat.    Eyes: Negative for double vision and photophobia.   Respiratory: Negative for cough, hemoptysis, sputum production, shortness of breath and wheezing.    Cardiovascular: Positive for leg swelling. Negative for chest pain, palpitations and orthopnea.   Gastrointestinal: Negative for abdominal pain, blood in stool, constipation, diarrhea, heartburn, nausea and vomiting.   Genitourinary: Negative for dysuria, hematuria and urgency.   Musculoskeletal: Negative for back pain, joint pain and myalgias.   Skin: Negative for itching and rash.   Neurological: Negative for dizziness, tingling, seizures, weakness and headaches.   Endo/Heme/Allergies: Negative for polydipsia. Does not bruise/bleed easily.   Psychiatric/Behavioral: Negative for depression and memory loss. The patient is  "nervous/anxious. The patient does not have insomnia.         Objective:     Vitals:    06/09/22 1513   BP: (!) 167/78   Pulse: 66   Resp: 16   Temp: 97.4 °F (36.3 °C)   TempSrc: Temporal   SpO2: 98%   Weight: 113.6 kg (250 lb 7.1 oz)   Height: 5' 4" (1.626 m)   PainSc: 0-No pain     Wt Readings from Last 10 Encounters:   06/09/22 113.6 kg (250 lb 7.1 oz)   11/19/21 111.6 kg (246 lb 0.5 oz)   12/05/20 112.9 kg (249 lb)   07/24/20 111.6 kg (246 lb 0.5 oz)   06/02/20 108.4 kg (239 lb)       Physical Examination:   Physical Exam  Vitals and nursing note reviewed.   Constitutional:       General: She is not in acute distress.     Appearance: She is not diaphoretic.   HENT:      Head: Normocephalic.      Mouth/Throat:      Pharynx: No oropharyngeal exudate.   Eyes:      General: No scleral icterus.     Conjunctiva/sclera: Conjunctivae normal.   Neck:      Thyroid: No thyromegaly.   Cardiovascular:      Rate and Rhythm: Normal rate and regular rhythm.      Heart sounds: Normal heart sounds. No murmur heard.  Pulmonary:      Effort: Pulmonary effort is normal. No respiratory distress.      Breath sounds: No stridor. No wheezing or rales.   Chest:      Chest wall: No tenderness.   Abdominal:      General: Bowel sounds are normal. There is no distension.      Palpations: Abdomen is soft. There is no mass.      Tenderness: There is no abdominal tenderness. There is no rebound.   Musculoskeletal:         General: No tenderness or deformity. Normal range of motion.      Cervical back: Neck supple.   Lymphadenopathy:      Cervical: No cervical adenopathy.   Skin:     General: Skin is warm and dry.      Findings: No erythema or rash.   Neurological:      Mental Status: She is alert and oriented to person, place, and time.      Cranial Nerves: No cranial nerve deficit.      Coordination: Coordination normal.      Gait: Gait is intact.   Psychiatric:         Mood and Affect: Affect normal.         Cognition and Memory: Memory " normal.         Judgment: Judgment normal.          Diagnostic Tests:  Significant Imaging: I have reviewed and interpreted all pertinent imaging results/findings.    Laboratory Data:  All pertinent labs have been reviewed.  Labs:   Lab Results   Component Value Date    WBC 9.77 06/09/2022    RBC 4.38 06/09/2022    HGB 13.4 06/09/2022    HCT 40.6 06/09/2022    MCV 93 06/09/2022     06/09/2022     (H) 06/09/2022     06/09/2022    K 4.5 06/09/2022    BUN 19 06/09/2022    CREATININE 0.9 06/09/2022    AST 15 06/09/2022    ALT 20 06/09/2022    BILITOT 0.3 06/09/2022       Assessment/Plan:   Acute deep vein thrombosis (DVT) of proximal vein of both lower extremities  Acute pulmonary embolism with acute cor pulmonale, unspecified pulmonary embolism type  Factor V Leiden mutation  Long term (current) use of anticoagulants  Given her history and heterozygous status for factor 5 Leiden mutation, she is a candidate for long-term anticoagulation.  Continue apixaban 5 mg p.o. b.i.d..  Clinically, she is stable.   I will see her back in 6 months or earlier in case of any concerns.  Refills provided.     Continue age-appropriate cancer screenings.    -     apixaban (ELIQUIS) 5 mg Tab; Take 1 tablet (5 mg total) by mouth 2 (two) times daily.  Dispense: 60 tablet; Refill: 6       ECOG SCORE    1 - Restricted in strenuous activity-ambulatory and able to carry out work of a light nature           Discussion:   No follow-ups on file.    Plan was discussed with the patient at length, and she verbalized understanding. Francia was given an opportunity to ask questions that were answered to her satisfaction, and she was advised to call in the interval if any problems or questions arise.    Electronically signed by Kim Romo MD      Route Chart for Scheduling    Med Onc Chart Routing      Follow up with physician 6 months. Labs same day as visit    Follow up with [facility]    Labs CMP and CBC   Lab interval:     Imaging     Pharmacy appointment    Other referrals

## 2022-12-09 ENCOUNTER — TELEPHONE (OUTPATIENT)
Dept: HEMATOLOGY/ONCOLOGY | Facility: CLINIC | Age: 57
End: 2022-12-09
Payer: COMMERCIAL

## 2022-12-09 NOTE — TELEPHONE ENCOUNTER
----- Message from Fred Nelson sent at 12/9/2022 11:17 AM CST -----  Type: Need Medical Advice  Who Called: Francia  Lior callback number: [phone]  Additional Info: Patient would like to do a virtual visit on Monday 12/12 and also have her lab orders sent to [email or web address] in [email or web address]   Please call to further assist, Thanks

## 2022-12-09 NOTE — TELEPHONE ENCOUNTER
Advise pt labs order fax to [facility] and to have them done today or tomorrow so they can be ready for office visit on Monday. Pt verbalized understanding. ----- Message from Karen Mcbride sent at 12/9/2022 12:00 PM CST -----  Type:  Patient Returning Call    Who Called:  Patient   Who Left Message for Patient:  Paloma  Does the patient know what this is regarding?:  yes  Best Call Back Number:  [phone]  Additional Information:  Patient is returning a call from Paloma. Patient would like to have labs sent to [facility] on [address]

## 2022-12-22 ENCOUNTER — OFFICE VISIT (OUTPATIENT)
Dept: HEMATOLOGY/ONCOLOGY | Facility: CLINIC | Age: 57
End: 2022-12-22
Payer: COMMERCIAL

## 2022-12-22 DIAGNOSIS — I82.4Y3 ACUTE DEEP VEIN THROMBOSIS (DVT) OF PROXIMAL VEIN OF BOTH LOWER EXTREMITIES: ICD-10-CM

## 2022-12-22 DIAGNOSIS — I26.09 ACUTE PULMONARY EMBOLISM WITH ACUTE COR PULMONALE, UNSPECIFIED PULMONARY EMBOLISM TYPE: ICD-10-CM

## 2022-12-22 DIAGNOSIS — I82.4Y3 ACUTE DEEP VEIN THROMBOSIS (DVT) OF PROXIMAL VEIN OF BOTH LOWER EXTREMITIES: Primary | ICD-10-CM

## 2022-12-22 PROCEDURE — 4010F ACE/ARB THERAPY RXD/TAKEN: CPT | Mod: CPTII,95,, | Performed by: INTERNAL MEDICINE

## 2022-12-22 PROCEDURE — 99213 PR OFFICE/OUTPT VISIT, EST, LEVL III, 20-29 MIN: ICD-10-PCS | Mod: 95,,, | Performed by: INTERNAL MEDICINE

## 2022-12-22 PROCEDURE — 1160F PR REVIEW ALL MEDS BY PRESCRIBER/CLIN PHARMACIST DOCUMENTED: ICD-10-PCS | Mod: CPTII,95,, | Performed by: INTERNAL MEDICINE

## 2022-12-22 PROCEDURE — 1159F MED LIST DOCD IN RCRD: CPT | Mod: CPTII,95,, | Performed by: INTERNAL MEDICINE

## 2022-12-22 PROCEDURE — 99213 OFFICE O/P EST LOW 20 MIN: CPT | Mod: 95,,, | Performed by: INTERNAL MEDICINE

## 2022-12-22 PROCEDURE — 1160F RVW MEDS BY RX/DR IN RCRD: CPT | Mod: CPTII,95,, | Performed by: INTERNAL MEDICINE

## 2022-12-22 PROCEDURE — 4010F PR ACE/ARB THEARPY RXD/TAKEN: ICD-10-PCS | Mod: CPTII,95,, | Performed by: INTERNAL MEDICINE

## 2022-12-22 PROCEDURE — 1159F PR MEDICATION LIST DOCUMENTED IN MEDICAL RECORD: ICD-10-PCS | Mod: CPTII,95,, | Performed by: INTERNAL MEDICINE

## 2022-12-22 NOTE — PROGRESS NOTES
FOLLOW UP TELEMEDICINE VISIT    Subjective:      Patient ID: Francia Rdo is a 57 y.o. female.  MRN: [id number]  : 1965    An audio and visual care visit was performed with the patient because of the COVID-19 pandemic recommendations for social distancing.    TELEMEDICINE  The patient location is: home  The chief complaint leading to consultation is: DVT/PE  Visit type: Virtual visit with synchronous audio and video    15 minutes of total time spent on the encounter, which includes face to face time and non-face to face time preparing to see the patient (eg, review of tests), obtaining and/or reviewing separately obtained history, documenting clinical information in the electronic or other health record, independently interpreting results (not separately reported) and communicating results to the patient/family/caregiver, or care coordination (not separately reported).    Each patient to whom he or she provides medical services by telemedicine is:  (1) informed of the relationship between the physician and patient and the respective role of any other health care provider with respect to management of the patient; and (2) notified that he or she may decline to receive medical services by telemedicine and may withdraw from such care at any time.    History of Present Illness:   HPI   Francia Rod is a 57 y.o. female who presents with  recurrent DVT and PE.      As previously documented, she was diagnosed with a PE in  when she presented with sudden shortness of breath.  This episode was attributed to OCP use which was then stopped.  She was treated with warfarin for 6 months.      In 2020, she presented to [facility] with bilateral PE and DVT.  She does report a long car ride to and [place].  Three days prior to admission, she developed worsening shortness of breath, substernal chest pain exacerbated with minimal activity as well as a low-grade fever.     A CTA showed several bilateral  pulmonary emboli with signs of cardiac strain. Venous ultrasound performed showed DVT involving the left femoral vein, popliteal vein and left posterior tibialis vein and the right posterior tibialis vein. D dimer was elevated to 6.55. BNP was 6923. She was started on full dose lovenox . She tested negative for COVID-19.      She was seen by Dr. MALIK conde and a catheter directed thrombolysis was attempted for acute submassive PE and acute cor pulmonale; however, it was unsuccessful. The patient continued to improve on Lovenox and was switched to Eliquis 5 mg po bid at the time of discharge.       She had hypercoagulable workup done and it is consistent with heterozygous factor 5 Leiden mutation.     Interim history:  She has remained on Apixaban since her last visit without any bruising, bleeding or other complications.  She denies any worsening shortness of breath.   She denies any calf tenderness.     Had a rash on her leg , was pruritic , improving.     Oncology History:  Oncology History    No history exists.      Past medical, surgical, family, and social history were reviewed today and there are no changes of note unless mentioned in HPI.   MEDS and ALLERGIES were reviewed with patient and meds reconciled.     History:  Past Medical History:   Diagnosis Date    Arthritis     DVT (deep venous thrombosis)     Factor V Leiden mutation 10/23/2020    Hypertension     Pulmonary embolism     Shingles     Thyroid disease       Past Surgical History:   Procedure Laterality Date    CHOLECYSTECTOMY       Family History   Problem Relation Age of Onset    Heart attack Mother     Rheum arthritis Father     Cancer Father     Heart attack Paternal Uncle     Colon cancer Paternal Grandmother     Heart attack Maternal Uncle       Social History     Tobacco Use    Smoking status: Never    Smokeless tobacco: Never   Substance and Sexual Activity    Alcohol use: Yes     Comment: occassionally    Drug use: Never    Sexual  activity: Not on file        ROS:  Review of Systems   Constitutional:  Negative for fever, malaise/fatigue and weight loss.   HENT:  Negative for congestion, hearing loss, nosebleeds and sore throat.    Eyes:  Negative for double vision and photophobia.   Respiratory:  Negative for cough, hemoptysis, sputum production, shortness of breath and wheezing.    Cardiovascular:  Negative for chest pain, palpitations, orthopnea and leg swelling.   Gastrointestinal:  Negative for abdominal pain, blood in stool, constipation, diarrhea, heartburn, nausea and vomiting.   Genitourinary:  Negative for dysuria, frequency, hematuria and urgency.   Musculoskeletal:  Negative for back pain, joint pain and myalgias.   Skin:  Positive for rash. Negative for itching.   Neurological:  Negative for dizziness, tingling, seizures, weakness and headaches.   Endo/Heme/Allergies:  Negative for polydipsia. Does not bruise/bleed easily.   Psychiatric/Behavioral:  Negative for depression and memory loss. The patient is not nervous/anxious and does not have insomnia.      Objective:   There were no vitals filed for this visit.  Wt Readings from Last 10 Encounters:   06/09/22 113.6 kg (250 lb 7.1 oz)   11/19/21 111.6 kg (246 lb 0.5 oz)   12/05/20 112.9 kg (249 lb)   07/24/20 111.6 kg (246 lb 0.5 oz)   06/02/20 108.4 kg (239 lb)       Physical Examination:   Constitutional: she is alert, pleasant, and does not appear to be in any physical distress   HENT: Mouth/Throat:  Tongue is midline without evidence of glossitis  Eyes: No obvious jaundice or conjunctivitis.  EOM are normal.   Pulmonary/Chest: No stridor noted. No excess chest muscle movement.  Neurological: she is alert and oriented to person, place, and time. No cranial nerve deficit.  Skin:  No rash noted. No erythema. + mild dermatitis on leg   Psychiatric: she has a normal mood and affect. Speech and memory normal.     Diagnostic Tests:  Significant Imaging: I have reviewed and interpreted  all pertinent imaging results/findings.    Laboratory Data:  All pertinent labs have been reviewed.    Labs:   Lab Results   Component Value Date    WBC 9.77 06/09/2022    RBC 4.38 06/09/2022    HGB 13.4 06/09/2022    HCT 40.6 06/09/2022    MCV 93 06/09/2022     06/09/2022     (H) 06/09/2022     06/09/2022    K 4.5 06/09/2022    BUN 19 06/09/2022    CREATININE 0.9 06/09/2022    AST 15 06/09/2022    ALT 20 06/09/2022    BILITOT 0.3 06/09/2022     Assessment/Plan:   Acute deep vein thrombosis (DVT) of proximal vein of both lower extremities  Acute pulmonary embolism with acute cor pulmonale, unspecified pulmonary embolism type    Factor V Leiden mutation  Long term (current) use of anticoagulants    Given her history and heterozygous status for factor 5 Leiden mutation, she is a candidate for long-term anticoagulation.    Continue apixaban 5 mg p.o. b.i.d..  Clinically, she is stable. Labs are normal.     I will see her back in 6 months or earlier in case of any concerns.  Refills provided.     Continue age-appropriate cancer screenings.     -     apixaban (ELIQUIS) 5 mg Tab; Take 1 tablet (5 mg total) by mouth 2 (two) times daily.  Dispense: 60 tablet; Refill: 6       ECOG SCORE    0 - Fully active-able to carry on all pre-disease performance without restriction       Discussion:   No follow-ups on file.    Plan was discussed with the patient at length, and she verbalized understanding. Francia was given an opportunity to ask questions that were answered to her satisfaction, and she was advised to call in the interval if any problems or questions arise.  Discussed COVID-19 and social distancing in great detail, avoid all non-essential visits out of the home if possible and avoid sick contacts.     Electronically signed by Kim Romo MD     Route Chart for Scheduling    Med Onc Chart Routing      Follow up with physician 6 months. same day labs and visit   Follow up with [facility]    Infusion  scheduling note    Injection scheduling note    Labs CBC and CMP   Lab interval:     Imaging    Pharmacy appointment    Other referrals         Answers submitted by the patient for this visit:  Review of Systems Questionnaire (Submitted on 12/22/2022)  appetite change : No  unexpected weight change: No  mouth sores: No  visual disturbance: No  adenopathy: No

## 2023-04-11 ENCOUNTER — TELEPHONE (OUTPATIENT)
Dept: HEMATOLOGY/ONCOLOGY | Facility: CLINIC | Age: 58
End: 2023-04-11
Payer: COMMERCIAL

## 2023-04-11 NOTE — TELEPHONE ENCOUNTER
Spoke to patient to let her know that appointment with Dr Romo on 6/26/23 will need to be rescheduled. Patient needs afternoon appointment so next available will be on 7/13/23 at 2:20 which patient accepted. Her labs are also rescheduled for 7/13 at 1:35.

## 2023-07-13 ENCOUNTER — OFFICE VISIT (OUTPATIENT)
Dept: HEMATOLOGY/ONCOLOGY | Facility: CLINIC | Age: 58
End: 2023-07-13
Payer: COMMERCIAL

## 2023-07-13 ENCOUNTER — LAB VISIT (OUTPATIENT)
Dept: LAB | Facility: HOSPITAL | Age: 58
End: 2023-07-13
Attending: INTERNAL MEDICINE
Payer: COMMERCIAL

## 2023-07-13 VITALS
SYSTOLIC BLOOD PRESSURE: 148 MMHG | RESPIRATION RATE: 16 BRPM | HEIGHT: 64 IN | WEIGHT: 258.19 LBS | HEART RATE: 67 BPM | BODY MASS INDEX: 44.08 KG/M2 | TEMPERATURE: 98 F | OXYGEN SATURATION: 97 % | DIASTOLIC BLOOD PRESSURE: 86 MMHG

## 2023-07-13 DIAGNOSIS — Z79.01 LONG TERM (CURRENT) USE OF ANTICOAGULANTS: ICD-10-CM

## 2023-07-13 DIAGNOSIS — I82.4Y3 ACUTE DEEP VEIN THROMBOSIS (DVT) OF PROXIMAL VEIN OF BOTH LOWER EXTREMITIES: ICD-10-CM

## 2023-07-13 DIAGNOSIS — D68.51 FACTOR V LEIDEN MUTATION: ICD-10-CM

## 2023-07-13 DIAGNOSIS — Z86.711 PERSONAL HISTORY OF PULMONARY EMBOLISM: ICD-10-CM

## 2023-07-13 DIAGNOSIS — I82.4Y3 ACUTE DEEP VEIN THROMBOSIS (DVT) OF PROXIMAL VEIN OF BOTH LOWER EXTREMITIES: Primary | ICD-10-CM

## 2023-07-13 DIAGNOSIS — I26.09 ACUTE PULMONARY EMBOLISM WITH ACUTE COR PULMONALE, UNSPECIFIED PULMONARY EMBOLISM TYPE: ICD-10-CM

## 2023-07-13 LAB
ALBUMIN SERPL BCP-MCNC: 3.5 G/DL (ref 3.5–5.2)
ALP SERPL-CCNC: 115 U/L (ref 55–135)
ALT SERPL W/O P-5'-P-CCNC: 25 U/L (ref 10–44)
ANION GAP SERPL CALC-SCNC: 9 MMOL/L (ref 8–16)
AST SERPL-CCNC: 25 U/L (ref 10–40)
BASOPHILS # BLD AUTO: 0.05 K/UL (ref 0–0.2)
BASOPHILS NFR BLD: 0.4 % (ref 0–1.9)
BILIRUB SERPL-MCNC: 0.3 MG/DL (ref 0.1–1)
BUN SERPL-MCNC: 16 MG/DL (ref 6–20)
CALCIUM SERPL-MCNC: 9.3 MG/DL (ref 8.7–10.5)
CHLORIDE SERPL-SCNC: 100 MMOL/L (ref 95–110)
CO2 SERPL-SCNC: 28 MMOL/L (ref 23–29)
CREAT SERPL-MCNC: 0.9 MG/DL (ref 0.5–1.4)
DIFFERENTIAL METHOD: ABNORMAL
EOSINOPHIL # BLD AUTO: 0.6 K/UL (ref 0–0.5)
EOSINOPHIL NFR BLD: 4.9 % (ref 0–8)
ERYTHROCYTE [DISTWIDTH] IN BLOOD BY AUTOMATED COUNT: 14.4 % (ref 11.5–14.5)
EST. GFR  (NO RACE VARIABLE): >60 ML/MIN/1.73 M^2
GLUCOSE SERPL-MCNC: 152 MG/DL (ref 70–110)
HCT VFR BLD AUTO: 37.6 % (ref 37–48.5)
HGB BLD-MCNC: 12.9 G/DL (ref 12–16)
IMM GRANULOCYTES # BLD AUTO: 0.03 K/UL (ref 0–0.04)
IMM GRANULOCYTES NFR BLD AUTO: 0.3 % (ref 0–0.5)
LYMPHOCYTES # BLD AUTO: 1.7 K/UL (ref 1–4.8)
LYMPHOCYTES NFR BLD: 14.7 % (ref 18–48)
MCH RBC QN AUTO: 31.9 PG (ref 27–31)
MCHC RBC AUTO-ENTMCNC: 34.3 G/DL (ref 32–36)
MCV RBC AUTO: 93 FL (ref 82–98)
MONOCYTES # BLD AUTO: 0.8 K/UL (ref 0.3–1)
MONOCYTES NFR BLD: 7.2 % (ref 4–15)
NEUTROPHILS # BLD AUTO: 8.4 K/UL (ref 1.8–7.7)
NEUTROPHILS NFR BLD: 72.5 % (ref 38–73)
NRBC BLD-RTO: 0 /100 WBC
PLATELET # BLD AUTO: 263 K/UL (ref 150–450)
PMV BLD AUTO: 11.1 FL (ref 9.2–12.9)
POTASSIUM SERPL-SCNC: 4.7 MMOL/L (ref 3.5–5.1)
PROT SERPL-MCNC: 7.6 G/DL (ref 6–8.4)
RBC # BLD AUTO: 4.05 M/UL (ref 4–5.4)
SODIUM SERPL-SCNC: 137 MMOL/L (ref 136–145)
WBC # BLD AUTO: 11.56 K/UL (ref 3.9–12.7)

## 2023-07-13 PROCEDURE — 3079F DIAST BP 80-89 MM HG: CPT | Mod: CPTII,S$GLB,, | Performed by: INTERNAL MEDICINE

## 2023-07-13 PROCEDURE — 3077F PR MOST RECENT SYSTOLIC BLOOD PRESSURE >= 140 MM HG: ICD-10-PCS | Mod: CPTII,S$GLB,, | Performed by: INTERNAL MEDICINE

## 2023-07-13 PROCEDURE — 99999 PR PBB SHADOW E&M-EST. PATIENT-LVL III: CPT | Mod: PBBFAC,,, | Performed by: INTERNAL MEDICINE

## 2023-07-13 PROCEDURE — 1160F PR REVIEW ALL MEDS BY PRESCRIBER/CLIN PHARMACIST DOCUMENTED: ICD-10-PCS | Mod: CPTII,S$GLB,, | Performed by: INTERNAL MEDICINE

## 2023-07-13 PROCEDURE — 3008F BODY MASS INDEX DOCD: CPT | Mod: CPTII,S$GLB,, | Performed by: INTERNAL MEDICINE

## 2023-07-13 PROCEDURE — 4010F ACE/ARB THERAPY RXD/TAKEN: CPT | Mod: CPTII,S$GLB,, | Performed by: INTERNAL MEDICINE

## 2023-07-13 PROCEDURE — 85025 COMPLETE CBC W/AUTO DIFF WBC: CPT | Mod: PN | Performed by: INTERNAL MEDICINE

## 2023-07-13 PROCEDURE — 36415 COLL VENOUS BLD VENIPUNCTURE: CPT | Mod: PN | Performed by: INTERNAL MEDICINE

## 2023-07-13 PROCEDURE — 99214 PR OFFICE/OUTPT VISIT, EST, LEVL IV, 30-39 MIN: ICD-10-PCS | Mod: S$GLB,,, | Performed by: INTERNAL MEDICINE

## 2023-07-13 PROCEDURE — 3008F PR BODY MASS INDEX (BMI) DOCUMENTED: ICD-10-PCS | Mod: CPTII,S$GLB,, | Performed by: INTERNAL MEDICINE

## 2023-07-13 PROCEDURE — 4010F PR ACE/ARB THEARPY RXD/TAKEN: ICD-10-PCS | Mod: CPTII,S$GLB,, | Performed by: INTERNAL MEDICINE

## 2023-07-13 PROCEDURE — 99214 OFFICE O/P EST MOD 30 MIN: CPT | Mod: S$GLB,,, | Performed by: INTERNAL MEDICINE

## 2023-07-13 PROCEDURE — 3079F PR MOST RECENT DIASTOLIC BLOOD PRESSURE 80-89 MM HG: ICD-10-PCS | Mod: CPTII,S$GLB,, | Performed by: INTERNAL MEDICINE

## 2023-07-13 PROCEDURE — 1160F RVW MEDS BY RX/DR IN RCRD: CPT | Mod: CPTII,S$GLB,, | Performed by: INTERNAL MEDICINE

## 2023-07-13 PROCEDURE — 1159F MED LIST DOCD IN RCRD: CPT | Mod: CPTII,S$GLB,, | Performed by: INTERNAL MEDICINE

## 2023-07-13 PROCEDURE — 80053 COMPREHEN METABOLIC PANEL: CPT | Mod: PN | Performed by: INTERNAL MEDICINE

## 2023-07-13 PROCEDURE — 3077F SYST BP >= 140 MM HG: CPT | Mod: CPTII,S$GLB,, | Performed by: INTERNAL MEDICINE

## 2023-07-13 PROCEDURE — 99999 PR PBB SHADOW E&M-EST. PATIENT-LVL III: ICD-10-PCS | Mod: PBBFAC,,, | Performed by: INTERNAL MEDICINE

## 2023-07-13 PROCEDURE — 1159F PR MEDICATION LIST DOCUMENTED IN MEDICAL RECORD: ICD-10-PCS | Mod: CPTII,S$GLB,, | Performed by: INTERNAL MEDICINE

## 2023-07-13 RX ORDER — CELECOXIB 100 MG/1
100 CAPSULE ORAL DAILY
COMMUNITY

## 2023-07-13 NOTE — PROGRESS NOTES
PROGRESS NOTE    Subjective:       Patient ID: Francia Rod is a 58 y.o. female.  MRN: [id number]  : 1965    Chief Complaint: Hypercoagulable state    History of Present Illness:   Francia Rod is a 58 y.o. female who presents with  recurrent DVT and PE.      As previously documented, she was diagnosed with a PE in  when she presented with sudden shortness of breath.  This episode was attributed to OCP use which was then stopped.  She was treated with warfarin for 6 months.      In 2020, she presented to [facility] with bilateral PE and DVT.  She does report a long car ride to and [place].  Three days prior to admission, she developed worsening shortness of breath, substernal chest pain exacerbated with minimal activity as well as a low-grade fever.     A CTA showed several bilateral pulmonary emboli with signs of cardiac strain. Venous ultrasound performed showed DVT involving the left femoral vein, popliteal vein and left posterior tibialis vein and the right posterior tibialis vein. D dimer was elevated to 6.55. BNP was 6923. She was started on full dose lovenox . She tested negative for COVID-19.     She was seen by  CV surgery and a catheter directed thrombolysis was attempted for acute submassive PE and acute cor pulmonale; however, it was unsuccessful. The patient continued to improve on Lovenox and was switched to Eliquis 5 mg po bid at the time of discharge.       She had hypercoagulable workup done and it is consistent with heterozygous factor 5 Leiden mutation.     Interim history:  She has remained on Apixaban since her last visit without any bruising, bleeding or other complications.  She denies any worsening shortness of breath.   She denies any calf tenderness. She has intermittent leg swellings, stable.   Has RA, has ankle and hand pain..   Found to be hypertensive today, will check blood pressure again when she  gets home.    Is due for a screening colonoscopy and gyn visit , is about to see her new pcp and will get referrals.      Oncology History:  Oncology History    No history exists.       History:  Past Medical History:   Diagnosis Date    Arthritis     DVT (deep venous thrombosis)     Factor V Leiden mutation 10/23/2020    Hypertension     Pulmonary embolism     Shingles     Thyroid disease       Past Surgical History:   Procedure Laterality Date    CHOLECYSTECTOMY       Family History   Problem Relation Age of Onset    Heart attack Mother     Rheum arthritis Father     Cancer Father     Heart attack Paternal Uncle     Colon cancer Paternal Grandmother     Heart attack Maternal Uncle      Social History     Tobacco Use    Smoking status: Never    Smokeless tobacco: Never   Substance and Sexual Activity    Alcohol use: Yes     Comment: occassionally    Drug use: Never    Sexual activity: Not on file        ROS:   Review of Systems   Constitutional:  Negative for fever, malaise/fatigue and weight loss.   HENT:  Negative for congestion, hearing loss, nosebleeds and sore throat.    Eyes:  Negative for double vision and photophobia.   Respiratory:  Negative for cough, hemoptysis, sputum production, shortness of breath and wheezing.    Cardiovascular:  Positive for leg swelling. Negative for chest pain, palpitations and orthopnea.   Gastrointestinal:  Negative for abdominal pain, blood in stool, constipation, diarrhea, heartburn, nausea and vomiting.   Genitourinary:  Negative for dysuria, frequency, hematuria and urgency.   Musculoskeletal:  Negative for back pain, joint pain and myalgias.   Skin:  Negative for itching and rash.   Neurological:  Negative for dizziness, tingling, seizures, weakness and headaches.   Endo/Heme/Allergies:  Negative for polydipsia. Does not bruise/bleed easily.   Psychiatric/Behavioral:  Negative for depression and memory loss. The patient is nervous/anxious. The patient does not have  "insomnia.       Objective:     Vitals:    07/13/23 1417   BP: (!) 148/86   Pulse: 67   Resp: 16   Temp: 97.6 °F (36.4 °C)   TempSrc: Temporal   SpO2: 97%   Weight: 117.1 kg (258 lb 2.5 oz)   Height: 5' 4" (1.626 m)   PainSc: 0-No pain       Wt Readings from Last 10 Encounters:   07/13/23 117.1 kg (258 lb 2.5 oz)   06/09/22 113.6 kg (250 lb 7.1 oz)   11/19/21 111.6 kg (246 lb 0.5 oz)   12/05/20 112.9 kg (249 lb)   07/24/20 111.6 kg (246 lb 0.5 oz)   06/02/20 108.4 kg (239 lb)       Physical Examination:   Physical Exam  Vitals and nursing note reviewed.   Constitutional:       General: She is not in acute distress.     Appearance: She is not diaphoretic.   HENT:      Head: Normocephalic.      Mouth/Throat:      Pharynx: No oropharyngeal exudate.   Eyes:      General: No scleral icterus.     Conjunctiva/sclera: Conjunctivae normal.   Neck:      Thyroid: No thyromegaly.   Cardiovascular:      Rate and Rhythm: Normal rate and regular rhythm.      Heart sounds: Normal heart sounds. No murmur heard.  Pulmonary:      Effort: Pulmonary effort is normal. No respiratory distress.      Breath sounds: No stridor. No wheezing or rales.   Chest:      Chest wall: No tenderness.   Abdominal:      General: Bowel sounds are normal. There is no distension.      Palpations: Abdomen is soft. There is no mass.      Tenderness: There is no abdominal tenderness. There is no rebound.   Musculoskeletal:         General: No tenderness or deformity. Normal range of motion.      Cervical back: Neck supple.   Lymphadenopathy:      Cervical: No cervical adenopathy.   Skin:     General: Skin is warm and dry.      Findings: No erythema or rash.   Neurological:      Mental Status: She is alert and oriented to person, place, and time.      Cranial Nerves: No cranial nerve deficit.      Coordination: Coordination normal.      Gait: Gait is intact.   Psychiatric:         Mood and Affect: Affect normal.         Cognition and Memory: Memory normal.       "   Judgment: Judgment normal.        Diagnostic Tests:  Significant Imaging: I have reviewed and interpreted all pertinent imaging results/findings.    Laboratory Data:  All pertinent labs have been reviewed.  Labs:   Lab Results   Component Value Date    WBC 11.56 07/13/2023    RBC 4.05 07/13/2023    HGB 12.9 07/13/2023    HCT 37.6 07/13/2023    MCV 93 07/13/2023     07/13/2023     (H) 07/13/2023     07/13/2023    K 4.7 07/13/2023    BUN 16 07/13/2023    CREATININE 0.9 07/13/2023    AST 25 07/13/2023    ALT 25 07/13/2023    BILITOT 0.3 07/13/2023       Assessment/Plan:   Acute deep vein thrombosis (DVT) of proximal vein of both lower extremities  Acute pulmonary embolism with acute cor pulmonale, unspecified pulmonary embolism type  Factor V Leiden mutation  Long term (current) use of anticoagulants  Given her history and heterozygous status for factor 5 Leiden mutation, she is a candidate for long-term anticoagulation.  Continue apixaban 5 mg p.o. b.i.d..  Clinically, she is stable.   I will see her back in 6 months or earlier in case of any concerns.  Refills provided.     Continue age-appropriate cancer screenings. Will be seeing  soon.        ECOG SCORE    1 - Restricted in strenuous activity-ambulatory and able to carry out work of a light nature           Discussion:   No follow-ups on file.    Plan was discussed with the patient at length, and she verbalized understanding. Francia was given an opportunity to ask questions that were answered to her satisfaction, and she was advised to call in the interval if any problems or questions arise.    Electronically signed by Kim Romo MD      Route Chart for Scheduling    Med Onc Chart Routing      Follow up with physician 6 months. virtual visit   Follow up with [facility]    Infusion scheduling note    Injection scheduling note    Labs CBC and CMP   Scheduling:  Preferred lab:  Lab interval:  [facility] labs   Imaging    Pharmacy  appointment    Other referrals                 Answers submitted by the patient for this visit:  Review of Systems Questionnaire (Submitted on 7/11/2023)  appetite change : No  unexpected weight change: No  mouth sores: No  visual disturbance: No  adenopathy: No

## 2023-08-11 ENCOUNTER — TELEPHONE (OUTPATIENT)
Dept: HEMATOLOGY/ONCOLOGY | Facility: CLINIC | Age: 58
End: 2023-08-11
Payer: COMMERCIAL

## 2023-08-11 DIAGNOSIS — I26.09 ACUTE PULMONARY EMBOLISM WITH ACUTE COR PULMONALE, UNSPECIFIED PULMONARY EMBOLISM TYPE: ICD-10-CM

## 2023-08-11 DIAGNOSIS — I82.4Y3 ACUTE DEEP VEIN THROMBOSIS (DVT) OF PROXIMAL VEIN OF BOTH LOWER EXTREMITIES: ICD-10-CM

## 2023-08-11 RX ORDER — APIXABAN 5 MG/1
5 TABLET, FILM COATED ORAL 2 TIMES DAILY
Qty: 60 TABLET | Refills: 6 | Status: SHIPPED | OUTPATIENT
Start: 2023-08-11 | End: 2024-01-18 | Stop reason: SDUPTHER

## 2023-08-11 NOTE — TELEPHONE ENCOUNTER
----- Message from Beth Velazquez, Patient Care Assistant sent at 8/11/2023 10:14 AM CDT -----  Type:  RX Refill Request    Who Called:  bruno Kumar or New Rx:  refill  RX Name and Strength:  apixaban (ELIQUIS) 5 mg Tab  Preferred Pharmacy with phone number:   [id number] - [place] - [address]  [facility] [address]  [address] [place] [address]  Phone: [phone] Fax: [phone]      Local or Mail Order:  local  Ordering Provider:  same  Best Call Back Number:  [phone]    Additional Information:  patient is needed a refill on above medication , please call when complete or needed further information, thanks

## 2024-01-08 DIAGNOSIS — I10 ESSENTIAL HYPERTENSION: ICD-10-CM

## 2024-01-08 RX ORDER — BISOPROLOL FUMARATE AND HYDROCHLOROTHIAZIDE 5; 6.25 MG/1; MG/1
TABLET ORAL
Qty: 90 TABLET | Refills: 3 | Status: SHIPPED | OUTPATIENT
Start: 2024-01-08

## 2024-01-17 ENCOUNTER — LAB VISIT (OUTPATIENT)
Dept: LAB | Facility: HOSPITAL | Age: 59
End: 2024-01-17
Payer: COMMERCIAL

## 2024-01-17 DIAGNOSIS — I82.4Y3 ACUTE DEEP VEIN THROMBOSIS (DVT) OF PROXIMAL VEIN OF BOTH LOWER EXTREMITIES: ICD-10-CM

## 2024-01-17 LAB
ALBUMIN SERPL BCP-MCNC: 3.5 G/DL (ref 3.5–5.2)
ALP SERPL-CCNC: 123 U/L (ref 55–135)
ALT SERPL W/O P-5'-P-CCNC: 18 U/L (ref 10–44)
ANION GAP SERPL CALC-SCNC: 8 MMOL/L (ref 8–16)
AST SERPL-CCNC: 16 U/L (ref 10–40)
BASOPHILS # BLD AUTO: 0.06 K/UL (ref 0–0.2)
BASOPHILS NFR BLD: 0.6 % (ref 0–1.9)
BILIRUB SERPL-MCNC: 0.4 MG/DL (ref 0.1–1)
BUN SERPL-MCNC: 15 MG/DL (ref 6–20)
CALCIUM SERPL-MCNC: 9.7 MG/DL (ref 8.7–10.5)
CHLORIDE SERPL-SCNC: 102 MMOL/L (ref 95–110)
CO2 SERPL-SCNC: 28 MMOL/L (ref 23–29)
CREAT SERPL-MCNC: 1 MG/DL (ref 0.5–1.4)
DIFFERENTIAL METHOD BLD: ABNORMAL
EOSINOPHIL # BLD AUTO: 0.2 K/UL (ref 0–0.5)
EOSINOPHIL NFR BLD: 1.5 % (ref 0–8)
ERYTHROCYTE [DISTWIDTH] IN BLOOD BY AUTOMATED COUNT: 13.8 % (ref 11.5–14.5)
EST. GFR  (NO RACE VARIABLE): >60 ML/MIN/1.73 M^2
GLUCOSE SERPL-MCNC: 153 MG/DL (ref 70–110)
HCT VFR BLD AUTO: 42 % (ref 37–48.5)
HGB BLD-MCNC: 13.5 G/DL (ref 12–16)
IMM GRANULOCYTES # BLD AUTO: 0.03 K/UL (ref 0–0.04)
IMM GRANULOCYTES NFR BLD AUTO: 0.3 % (ref 0–0.5)
LYMPHOCYTES # BLD AUTO: 1.7 K/UL (ref 1–4.8)
LYMPHOCYTES NFR BLD: 17.7 % (ref 18–48)
MCH RBC QN AUTO: 30.8 PG (ref 27–31)
MCHC RBC AUTO-ENTMCNC: 32.1 G/DL (ref 32–36)
MCV RBC AUTO: 96 FL (ref 82–98)
MONOCYTES # BLD AUTO: 0.7 K/UL (ref 0.3–1)
MONOCYTES NFR BLD: 7.4 % (ref 4–15)
NEUTROPHILS # BLD AUTO: 7.1 K/UL (ref 1.8–7.7)
NEUTROPHILS NFR BLD: 72.5 % (ref 38–73)
NRBC BLD-RTO: 0 /100 WBC
PLATELET # BLD AUTO: 283 K/UL (ref 150–450)
PMV BLD AUTO: 12 FL (ref 9.2–12.9)
POTASSIUM SERPL-SCNC: 4.5 MMOL/L (ref 3.5–5.1)
PROT SERPL-MCNC: 7.9 G/DL (ref 6–8.4)
RBC # BLD AUTO: 4.39 M/UL (ref 4–5.4)
SODIUM SERPL-SCNC: 138 MMOL/L (ref 136–145)
WBC # BLD AUTO: 9.82 K/UL (ref 3.9–12.7)

## 2024-01-17 PROCEDURE — 80053 COMPREHEN METABOLIC PANEL: CPT | Performed by: INTERNAL MEDICINE

## 2024-01-17 PROCEDURE — 85025 COMPLETE CBC W/AUTO DIFF WBC: CPT | Performed by: INTERNAL MEDICINE

## 2024-01-17 PROCEDURE — 36415 COLL VENOUS BLD VENIPUNCTURE: CPT | Mod: PO | Performed by: INTERNAL MEDICINE

## 2024-01-18 ENCOUNTER — OFFICE VISIT (OUTPATIENT)
Dept: HEMATOLOGY/ONCOLOGY | Facility: CLINIC | Age: 59
End: 2024-01-18
Payer: COMMERCIAL

## 2024-01-18 DIAGNOSIS — E66.01 CLASS 3 OBESITY: ICD-10-CM

## 2024-01-18 DIAGNOSIS — Z79.01 LONG TERM (CURRENT) USE OF ANTICOAGULANTS: ICD-10-CM

## 2024-01-18 DIAGNOSIS — D68.51 FACTOR V LEIDEN MUTATION: ICD-10-CM

## 2024-01-18 DIAGNOSIS — I82.4Y3 ACUTE DEEP VEIN THROMBOSIS (DVT) OF PROXIMAL VEIN OF BOTH LOWER EXTREMITIES: Primary | ICD-10-CM

## 2024-01-18 DIAGNOSIS — M06.9 RHEUMATOID ARTHRITIS, INVOLVING UNSPECIFIED SITE, UNSPECIFIED WHETHER RHEUMATOID FACTOR PRESENT: ICD-10-CM

## 2024-01-18 DIAGNOSIS — I26.09 ACUTE PULMONARY EMBOLISM WITH ACUTE COR PULMONALE, UNSPECIFIED PULMONARY EMBOLISM TYPE: ICD-10-CM

## 2024-01-18 PROCEDURE — 99213 OFFICE O/P EST LOW 20 MIN: CPT | Mod: 95,,, | Performed by: INTERNAL MEDICINE

## 2024-01-18 NOTE — PROGRESS NOTES
FOLLOW UP TELEMEDICINE VISIT    Subjective:      Patient ID: Francia Rod is a 58 y.o. female.  MRN: [id number]  : 1965    An audio and visual care visit was performed with the patient because of the COVID-19 pandemic recommendations for social distancing.    TELEMEDICINE  The patient location is: home  The chief complaint leading to consultation is: DVT/PE, hypercoagulable state   Visit type: Virtual visit with synchronous audio and video    15 minutes of total time spent on the encounter, which includes face to face time and non-face to face time preparing to see the patient (eg, review of tests), obtaining and/or reviewing separately obtained history, documenting clinical information in the electronic or other health record, independently interpreting results (not separately reported) and communicating results to the patient/family/caregiver, or care coordination (not separately reported).    Each patient to whom he or she provides medical services by telemedicine is:  (1) informed of the relationship between the physician and patient and the respective role of any other health care provider with respect to management of the patient; and (2) notified that he or she may decline to receive medical services by telemedicine and may withdraw from such care at any time.    History of Present Illness:   HPI   Francia Rod is a 58 y.o. female who presents with  recurrent DVT and PE.      As previously documented, she was diagnosed with a PE in  when she presented with sudden shortness of breath.  This episode was attributed to OCP use which was then stopped.  She was treated with warfarin for 6 months.      In 2020, she presented to [facility] with bilateral PE and DVT.  She does report a long car ride to and [place].  Three days prior to admission, she developed worsening shortness of breath, substernal chest pain exacerbated with minimal activity as well as a low-grade fever.     A CTA showed  several bilateral pulmonary emboli with signs of cardiac strain. Venous ultrasound performed showed DVT involving the left femoral vein, popliteal vein and left posterior tibialis vein and the right posterior tibialis vein. D dimer was elevated to 6.55. BNP was 6923. She was started on full dose lovenox . She tested negative for COVID-19.      She was seen by Dr. MALIK conde and a catheter directed thrombolysis was attempted for acute submassive PE and acute cor pulmonale; however, it was unsuccessful. The patient continued to improve on Lovenox and was switched to Eliquis 5 mg po bid at the time of discharge.       She had hypercoagulable workup done and it is consistent with heterozygous factor 5 Leiden mutation.     Interim history:  She has remained on Apixaban since her last visit without any bruising, bleeding or other complications.  She denies any worsening shortness of breath.   She denies any calf tenderness.    Has RA, has ankle and hand pain..      Is due for a screening colonoscopy and gyn visit ,did not see her new pcp, attributes it being busy at work. Encouraged to follow up.     Oncology History:  Oncology History    No history exists.      Past medical, surgical, family, and social history were reviewed today and there are no changes of note unless mentioned in HPI.   MEDS and ALLERGIES were reviewed with patient and meds reconciled.     History:  Past Medical History:   Diagnosis Date    Arthritis     DVT (deep venous thrombosis)     Factor V Leiden mutation 10/23/2020    Hypertension     Pulmonary embolism     Shingles     Thyroid disease       Past Surgical History:   Procedure Laterality Date    CHOLECYSTECTOMY       Family History   Problem Relation Age of Onset    Heart attack Mother     Rheum arthritis Father     Cancer Father     Heart attack Paternal Uncle     Colon cancer Paternal Grandmother     Heart attack Maternal Uncle       Social History     Tobacco Use    Smoking status: Never     Smokeless tobacco: Never   Substance and Sexual Activity    Alcohol use: Yes     Comment: occassionally    Drug use: Never    Sexual activity: Not on file        ROS:  Review of Systems   Constitutional:  Negative for fever, malaise/fatigue and weight loss.   HENT:  Negative for congestion, ear pain, nosebleeds and sore throat.    Eyes:  Negative for blurred vision, double vision and photophobia.   Respiratory:  Positive for cough. Negative for hemoptysis, sputum production, shortness of breath, wheezing and stridor.    Cardiovascular:  Positive for leg swelling. Negative for chest pain, palpitations and orthopnea.   Gastrointestinal:  Negative for abdominal pain, blood in stool, constipation, diarrhea, heartburn, melena, nausea and vomiting.   Genitourinary:  Negative for dysuria, frequency and hematuria.   Musculoskeletal:  Positive for joint pain. Negative for back pain, myalgias and neck pain.   Skin:  Negative for itching and rash.   Neurological:  Negative for dizziness, focal weakness, seizures, weakness and headaches.   Endo/Heme/Allergies:  Negative for polydipsia. Does not bruise/bleed easily.   Psychiatric/Behavioral:  Negative for depression and memory loss. The patient is nervous/anxious. The patient does not have insomnia.        Objective:   There were no vitals filed for this visit.  Wt Readings from Last 10 Encounters:   07/13/23 117.1 kg (258 lb 2.5 oz)   06/09/22 113.6 kg (250 lb 7.1 oz)   11/19/21 111.6 kg (246 lb 0.5 oz)   12/05/20 112.9 kg (249 lb)   07/24/20 111.6 kg (246 lb 0.5 oz)   06/02/20 108.4 kg (239 lb)       Physical Examination:   Constitutional: she is alert, pleasant, and does not appear to be in any physical distress   HENT: Mouth/Throat:  Tongue is midline without evidence of glossitis  Eyes: No obvious jaundice or conjunctivitis.  EOM are normal.   Pulmonary/Chest: No stridor noted. No excess chest muscle movement.  Neurological: she is alert and oriented to person, place, and  time. No cranial nerve deficit.  Skin:  No rash noted. No erythema.   Psychiatric: she has a normal mood and affect. Speech and memory normal.     Diagnostic Tests:  Significant Imaging: I have reviewed and interpreted all pertinent imaging results/findings.    Laboratory Data:  All pertinent labs have been reviewed.    Labs:   Lab Results   Component Value Date    WBC 9.82 01/17/2024    RBC 4.39 01/17/2024    HGB 13.5 01/17/2024    HCT 42.0 01/17/2024    MCV 96 01/17/2024     01/17/2024     (H) 01/17/2024     01/17/2024    K 4.5 01/17/2024    BUN 15 01/17/2024    CREATININE 1.0 01/17/2024    AST 16 01/17/2024    ALT 18 01/17/2024    BILITOT 0.4 01/17/2024     Assessment/Plan:   Acute deep vein thrombosis (DVT) of proximal vein of both lower extremities  Factor V Leiden mutation  Long term (current) use of anticoagulants     Given her history and heterozygous status for factor 5 Leiden mutation, she is a candidate for long-term anticoagulation.    Continue apixaban 5 mg p.o. b.i.d..  Clinically, she is stable.   I will see her back in 6 months or earlier in case of any concerns.  Refills provided.     Continue age-appropriate cancer screenings. Will be seeing Dr.Klibert foote    -     apixaban (ELIQUIS) 5 mg Tab; Take 1 tablet (5 mg total) by mouth 2 (two) times daily.  Dispense: 60 each; Refill: 6  -     CBC Auto Differential; Standing  -     CMP; Future; Expected date: 01/18/2024    Acute pulmonary embolism with acute cor pulmonale, unspecified pulmonary embolism type  -     apixaban (ELIQUIS) 5 mg Tab; Take 1 tablet (5 mg total) by mouth 2 (two) times daily.  Dispense: 60 each; Refill: 6    Class 3 obesity  Monitor.     Rheumatoid arthritis, involving unspecified site, unspecified whether rheumatoid factor present  Follow up with Rheumatology.       ECOG SCORE    0 - Fully active-able to carry on all pre-disease performance without restriction       Discussion:   No follow-ups on file.    Plan  was discussed with the patient at length, and she verbalized understanding. Francia was given an opportunity to ask questions that were answered to her satisfaction, and she was advised to call in the interval if any problems or questions arise.  Discussed COVID-19 and social distancing in great detail, avoid all non-essential visits out of the home if possible and avoid sick contacts.     Electronically signed by Kim Romo MD     Answers submitted by the patient for this visit:  Review of Systems Questionnaire (Submitted on 1/18/2024)  appetite change : No  unexpected weight change: No  mouth sores: No  visual disturbance: No  adenopathy: No    Route Chart for Scheduling    Med Onc Chart Routing      Follow up with physician 6 months. Benign heme at [facility]   Follow up with [facility]    Infusion scheduling note    Injection scheduling note    Labs CBC and CMP   Scheduling:  Preferred lab:  Lab interval:     Imaging    Pharmacy appointment    Other referrals

## 2024-01-18 NOTE — PROGRESS NOTES
PROGRESS NOTE    Subjective:       Patient ID: Francia Rod is a 58 y.o. female.  MRN: [id number]  : 1965    Chief Complaint: Hypercoagulable state    History of Present Illness:   Francia Rod is a 58 y.o. female who presents with  recurrent DVT and PE.      As previously documented, she was diagnosed with a PE in  when she presented with sudden shortness of breath.  This episode was attributed to OCP use which was then stopped.  She was treated with warfarin for 6 months.      In 2020, she presented to [facility] with bilateral PE and DVT.  She does report a long car ride to and [place].  Three days prior to admission, she developed worsening shortness of breath, substernal chest pain exacerbated with minimal activity as well as a low-grade fever.     A CTA showed several bilateral pulmonary emboli with signs of cardiac strain. Venous ultrasound performed showed DVT involving the left femoral vein, popliteal vein and left posterior tibialis vein and the right posterior tibialis vein. D dimer was elevated to 6.55. BNP was 6923. She was started on full dose lovenox . She tested negative for COVID-19.     She was seen by  CV surgery and a catheter directed thrombolysis was attempted for acute submassive PE and acute cor pulmonale; however, it was unsuccessful. The patient continued to improve on Lovenox and was switched to Eliquis 5 mg po bid at the time of discharge.       She had hypercoagulable workup done and it is consistent with heterozygous factor 5 Leiden mutation.     Interim history:  She has remained on Apixaban since her last visit without any bruising, bleeding or other complications.  She denies any worsening shortness of breath.   She denies any calf tenderness.    Has RA, has ankle and hand pain..     Is due for a screening colonoscopy and gyn visit ,did not see her new pcp, attributes it being busy at work.  Encouraged to follow up.     Oncology History:  Oncology History    No history exists.       History:  Past Medical History:   Diagnosis Date    Arthritis     DVT (deep venous thrombosis)     Factor V Leiden mutation 10/23/2020    Hypertension     Pulmonary embolism     Shingles     Thyroid disease       Past Surgical History:   Procedure Laterality Date    CHOLECYSTECTOMY       Family History   Problem Relation Age of Onset    Heart attack Mother     Rheum arthritis Father     Cancer Father     Heart attack Paternal Uncle     Colon cancer Paternal Grandmother     Heart attack Maternal Uncle      Social History     Tobacco Use    Smoking status: Never    Smokeless tobacco: Never   Substance and Sexual Activity    Alcohol use: Yes     Comment: occassionally    Drug use: Never    Sexual activity: Not on file        ROS:   Review of Systems   Constitutional:  Negative for fever, malaise/fatigue and weight loss.   HENT:  Negative for congestion, hearing loss, nosebleeds and sore throat.    Eyes:  Negative for double vision and photophobia.   Respiratory:  Negative for cough, hemoptysis, sputum production, shortness of breath and wheezing.    Cardiovascular:  Positive for leg swelling. Negative for chest pain, palpitations and orthopnea.   Gastrointestinal:  Negative for abdominal pain, blood in stool, constipation, diarrhea, heartburn, nausea and vomiting.   Genitourinary:  Negative for dysuria, frequency, hematuria and urgency.   Musculoskeletal:  Negative for back pain, joint pain and myalgias.   Skin:  Negative for itching and rash.   Neurological:  Negative for dizziness, tingling, seizures, weakness and headaches.   Endo/Heme/Allergies:  Negative for polydipsia. Does not bruise/bleed easily.   Psychiatric/Behavioral:  Negative for depression and memory loss. The patient is nervous/anxious. The patient does not have insomnia.         Objective:     There were no vitals filed for this visit.      Wt Readings from  Last 10 Encounters:   07/13/23 117.1 kg (258 lb 2.5 oz)   06/09/22 113.6 kg (250 lb 7.1 oz)   11/19/21 111.6 kg (246 lb 0.5 oz)   12/05/20 112.9 kg (249 lb)   07/24/20 111.6 kg (246 lb 0.5 oz)   06/02/20 108.4 kg (239 lb)       Physical Examination:   Physical Exam  Vitals and nursing note reviewed.   Constitutional:       General: She is not in acute distress.     Appearance: She is not diaphoretic.   HENT:      Head: Normocephalic.      Mouth/Throat:      Pharynx: No oropharyngeal exudate.   Eyes:      General: No scleral icterus.     Conjunctiva/sclera: Conjunctivae normal.   Neck:      Thyroid: No thyromegaly.   Cardiovascular:      Rate and Rhythm: Normal rate and regular rhythm.      Heart sounds: Normal heart sounds. No murmur heard.  Pulmonary:      Effort: Pulmonary effort is normal. No respiratory distress.      Breath sounds: No stridor. No wheezing or rales.   Chest:      Chest wall: No tenderness.   Abdominal:      General: Bowel sounds are normal. There is no distension.      Palpations: Abdomen is soft. There is no mass.      Tenderness: There is no abdominal tenderness. There is no rebound.   Musculoskeletal:         General: No tenderness or deformity. Normal range of motion.      Cervical back: Neck supple.   Lymphadenopathy:      Cervical: No cervical adenopathy.   Skin:     General: Skin is warm and dry.      Findings: No erythema or rash.   Neurological:      Mental Status: She is alert and oriented to person, place, and time.      Cranial Nerves: No cranial nerve deficit.      Coordination: Coordination normal.      Gait: Gait is intact.   Psychiatric:         Mood and Affect: Affect normal.         Cognition and Memory: Memory normal.         Judgment: Judgment normal.          Diagnostic Tests:  Significant Imaging: I have reviewed and interpreted all pertinent imaging results/findings.    Laboratory Data:  All pertinent labs have been reviewed.  Labs:   Lab Results   Component Value Date     WBC 9.82 01/17/2024    RBC 4.39 01/17/2024    HGB 13.5 01/17/2024    HCT 42.0 01/17/2024    MCV 96 01/17/2024     01/17/2024     (H) 01/17/2024     01/17/2024    K 4.5 01/17/2024    BUN 15 01/17/2024    CREATININE 1.0 01/17/2024    AST 16 01/17/2024    ALT 18 01/17/2024    BILITOT 0.4 01/17/2024       Assessment/Plan:   Acute deep vein thrombosis (DVT) of proximal vein of both lower extremities  Acute pulmonary embolism with acute cor pulmonale, unspecified pulmonary embolism type  Factor V Leiden mutation  Long term (current) use of anticoagulants    Given her history and heterozygous status for factor 5 Leiden mutation, she is a candidate for long-term anticoagulation.    Continue apixaban 5 mg p.o. b.i.d..  Clinically, she is stable.   I will see her back in 6 months or earlier in case of any concerns.  Refills provided.     Continue age-appropriate cancer screenings. Will be seeing  soon.      Obesity  Monitor    RA  Follow up with Rheumatology.     ECOG SCORE               Discussion:   No follow-ups on file.    Plan was discussed with the patient at length, and she verbalized understanding. Francia was given an opportunity to ask questions that were answered to her satisfaction, and she was advised to call in the interval if any problems or questions arise.    Electronically signed by Kim Romo MD      Route Chart for Scheduling    Med Onc Chart Routing      Follow up with physician 6 months. Benign heme clinic any provider   Follow up with [facility]    Infusion scheduling note    Injection scheduling note    Labs CBC and CMP   Scheduling:  Preferred lab:  Lab interval:     Imaging    Pharmacy appointment    Other referrals                  Answers submitted by the patient for this visit:  Review of Systems Questionnaire (Submitted on 1/18/2024)  appetite change : No  unexpected weight change: No  mouth sores: No  visual disturbance: No  adenopathy: No

## 2024-07-23 ENCOUNTER — TELEPHONE (OUTPATIENT)
Dept: HEMATOLOGY/ONCOLOGY | Facility: CLINIC | Age: 59
End: 2024-07-23
Payer: COMMERCIAL

## 2024-07-23 NOTE — TELEPHONE ENCOUNTER
Called patient to see if she was able to make her 10 o'clock appointment with Dr Jose today. Patient stated she wants to reschedule it. Patient will call back when she is ready to schedule appointment.

## 2024-08-01 ENCOUNTER — TELEPHONE (OUTPATIENT)
Dept: HEMATOLOGY/ONCOLOGY | Facility: CLINIC | Age: 59
End: 2024-08-01
Payer: COMMERCIAL

## 2024-09-04 NOTE — PROGRESS NOTES
[facility] - NEW PATIENT VISIT    Reason for visit: Establish Care     Best Contact Phone Number(s): [phone] (home)      DVT/PE  2006: Presents with PE (2006) attributed to OCP which was discontinued, on anticoagulation x6m  4/2020: Recurrent DVT/PE after prolonged car ride, negative for Covid. Underwent unsuccessful thrombolysis for acute submassive PE and core pulmonale. Hypercoagulable work-up reveals heterozygous factor 5 Leiden mutation    HPI: Francia Rod is a 59 y.o. female with history of recurrent DVT/PE who presents for follow-up. Patient is on Eliquis daily. No issues with bruising / bleeding. Is struggling with weight loss. Works as  at [employer]. Notes high stress with work contributing to weight gain.     Patient presents alone.  ECOG PS is 0.  History has been obtained by chart review and discussion with the patient.    ROS:   A complete 12-point review of systems was reviewed and is negative except as mentioned above.     Past Medical History:   Past Medical History:   Diagnosis Date    Arthritis     DVT (deep venous thrombosis)     Factor V Leiden mutation 10/23/2020    Hypertension     Pulmonary embolism     Shingles     Thyroid disease         Past Surgical History:   Past Surgical History:   Procedure Laterality Date    CHOLECYSTECTOMY          Family History:   Family History   Problem Relation Name Age of Onset    Heart attack Mother      Rheum arthritis Father      Cancer Father      Heart attack Paternal Uncle      Colon cancer Paternal Grandmother      Heart attack Maternal Uncle          Social History:   Social History     Tobacco Use    Smoking status: Never    Smokeless tobacco: Never   Substance Use Topics    Alcohol use: Yes     Comment: occassionally        I have reviewed and updated the patient's past medical, surgical, family and social histories.    Allergies:   Review of patient's allergies indicates:  No Known  "Allergies     Medications:   Current Outpatient Medications   Medication Sig Dispense Refill    amLODIPine (NORVASC) 10 MG tablet Take 10 mg by mouth once daily.      apixaban (ELIQUIS) 5 mg Tab Take 1 tablet (5 mg total) by mouth 2 (two) times daily. 60 each 6    bisoprolol-hydrochlorothiazide 5-6.25 mg (ZIAC) 5-6.25 mg Tab TAKE 1 TABLET BY MOUTH EVERY DAY 90 tablet 3    captopriL (CAPOTEN) 12.5 MG tablet TAKE 1 TABLET (12.5 MG TOTAL) BY MOUTH 3 (THREE) TIMES DAILY. 270 tablet 3    FOLIC ACID ORAL Take by mouth.      methIMAzole (TAPAZOLE) 5 MG Tab TAKE 1 TABLET BY MOUTH EVERY OTHER DAY 45 tablet 3    celecoxib (CELEBREX) 100 MG capsule Take 100 mg by mouth once daily. (Patient not taking: Reported on 9/5/2024)      METHOTREXATE ORAL Take by mouth. (Patient not taking: Reported on 9/5/2024)      sars-cov-2, covid-19, (PFIZER COVID-19) 30 mcg/0.3 ml injection  (Patient not taking: Reported on 9/5/2024)       No current facility-administered medications for this visit.        Physical Exam:   BP (!) 158/77 (BP Location: Left arm, Patient Position: Sitting, BP Method: Medium (Automatic))   Pulse 60   Ht 5' 4" (1.626 m)   Wt 115.8 kg (255 lb 4.7 oz)   BMI 43.82 kg/m²                Physical Exam  Constitutional:       Appearance: Normal appearance.   HENT:      Head: Normocephalic and atraumatic.      Mouth/Throat:      Mouth: Mucous membranes are moist.   Eyes:      Extraocular Movements: Extraocular movements intact.      Pupils: Pupils are equal, round, and reactive to light.   Cardiovascular:      Rate and Rhythm: Normal rate and regular rhythm.      Pulses: Normal pulses.      Heart sounds: No murmur heard.  Pulmonary:      Effort: Pulmonary effort is normal. No respiratory distress.      Breath sounds: Normal breath sounds.   Abdominal:      General: Abdomen is flat. There is no distension.      Palpations: Abdomen is soft.      Tenderness: There is no abdominal tenderness.   Musculoskeletal:         General: " No swelling or tenderness. Normal range of motion.      Cervical back: Normal range of motion. No rigidity.   Skin:     General: Skin is warm and dry.      Coloration: Skin is not jaundiced.   Neurological:      General: No focal deficit present.      Mental Status: She is alert and oriented to person, place, and time.   Psychiatric:         Mood and Affect: Mood normal.         Behavior: Behavior normal.           Labs:   Lab Results   Component Value Date    WBC 11.31 09/05/2024    HGB 12.9 09/05/2024    HCT 38.0 09/05/2024    MCV 93 09/05/2024     09/05/2024       Lab Results   Component Value Date     09/05/2024    K 3.9 09/05/2024     09/05/2024    CO2 28 09/05/2024    BUN 15 09/05/2024    CREATININE 1.0 09/05/2024    ALBUMIN 3.4 (L) 09/05/2024    BILITOT 0.3 09/05/2024    ALKPHOS 115 09/05/2024    AST 23 09/05/2024    ALT 23 09/05/2024       Imaging:   X-Ray Tibia Fibula 2 View Right  Narrative: EXAMINATION:  XR ANKLE COMPLETE 3 VIEW RIGHT; XR TIBIA FIBULA 2 VIEW RIGHT    CLINICAL HISTORY:  Pain in right ankle and joints of right foot    TECHNIQUE:  AP, lateral, and oblique images of the right ankle; AP and lateral views right tibia    COMPARISON:  None    FINDINGS:  Bones appear well mineralized.  Prominence of the soft tissues about the ankle, greatest laterally suggesting nonspecific swelling.  Small well corticated ossific bodies adjacent to the medial and also lateral malleolar tips suggesting sequela of remote trauma with nonunion versus accessory ossicles.  Ankle mortise is otherwise well aligned and appears intact aside from mild degenerative change.  Mild degenerative change at the imaged knee.  No evidence of acute displaced fracture, dislocation or destructive osseous process.  Prominent enthesophyte at the Achilles insertion site.  No subcutaneous emphysema or radiodense retained foreign body.  Impression: Suspected nonspecific soft tissue swelling about the ankle, greatest  laterally without convincing evidence for acute displaced fracture-dislocation noting suspected accessory ossicles or sequela of remote trauma with nonunion adjacent to the medial and lateral malleolar tips.  Correlate for point tenderness at these regions.    Electronically signed by: Erik Archibald MD  Date:    12/05/2020  Time:    15:33  X-Ray Ankle Complete Right  Narrative: EXAMINATION:  XR ANKLE COMPLETE 3 VIEW RIGHT; XR TIBIA FIBULA 2 VIEW RIGHT    CLINICAL HISTORY:  Pain in right ankle and joints of right foot    TECHNIQUE:  AP, lateral, and oblique images of the right ankle; AP and lateral views right tibia    COMPARISON:  None    FINDINGS:  Bones appear well mineralized.  Prominence of the soft tissues about the ankle, greatest laterally suggesting nonspecific swelling.  Small well corticated ossific bodies adjacent to the medial and also lateral malleolar tips suggesting sequela of remote trauma with nonunion versus accessory ossicles.  Ankle mortise is otherwise well aligned and appears intact aside from mild degenerative change.  Mild degenerative change at the imaged knee.  No evidence of acute displaced fracture, dislocation or destructive osseous process.  Prominent enthesophyte at the Achilles insertion site.  No subcutaneous emphysema or radiodense retained foreign body.  Impression: Suspected nonspecific soft tissue swelling about the ankle, greatest laterally without convincing evidence for acute displaced fracture-dislocation noting suspected accessory ossicles or sequela of remote trauma with nonunion adjacent to the medial and lateral malleolar tips.  Correlate for point tenderness at these regions.    Electronically signed by: Erik Archibald MD  Date:    12/05/2020  Time:    15:33          Assessment:       1. Acute deep vein thrombosis (DVT) of proximal vein of both lower extremities    2. Acute pulmonary embolism with acute cor pulmonale, unspecified pulmonary embolism type    3. Factor V  Leiden mutation    4. Rheumatoid arthritis, involving unspecified site, unspecified whether rheumatoid factor present    5. Routine health maintenance          Plan:             # DVT/PE - History of DVT/PE x2. One copy of factor V Leiden, not an independent indication for indefinite anticoagulation but in setting of recurrent thrombosis would proceed with lifelong anticoagulation. Will reach out to PCP regarding refills, patient is amenable to plan.     # RA - Established with rheumatology on MTX    # RHM - Plans to establish with PCP 11/2024. Start with screening mammogram now, prefers to defer colon CA screening until PCP visit     # HTN - /77 today. On medications long term Ziac + Capoten and amlodipine. Will address with PCP     Follow up: as needed      The above information has been reviewed with the patient and all questions have been answered to their apparent satisfaction.  They understand that they can call the clinic with any questions. All diagnostic tests and imaging personally reviewed and interpreted, communication with consultants as appropriate. Visit complexity inherent to evaluation and management associated with medical care services that serve as the continuing focal point for all needed health care services and/or with medical care services that are part of ongoing care related to a patient's single, serious condition or a complex condition provided today    Ebony Jose MD  Hematology/Oncology  [facility]      Med Onc Chart Routing      Follow up with physician No follow up needed.   Follow up with [facility]    Infusion scheduling note    Injection scheduling note    Labs    Imaging   Screening mammogram next available [facility]   Pharmacy appointment    Other referrals

## 2024-09-05 ENCOUNTER — OFFICE VISIT (OUTPATIENT)
Dept: HEMATOLOGY/ONCOLOGY | Facility: CLINIC | Age: 59
End: 2024-09-05
Payer: COMMERCIAL

## 2024-09-05 ENCOUNTER — LAB VISIT (OUTPATIENT)
Dept: LAB | Facility: HOSPITAL | Age: 59
End: 2024-09-05
Attending: STUDENT IN AN ORGANIZED HEALTH CARE EDUCATION/TRAINING PROGRAM
Payer: COMMERCIAL

## 2024-09-05 VITALS
HEIGHT: 64 IN | SYSTOLIC BLOOD PRESSURE: 158 MMHG | HEART RATE: 60 BPM | WEIGHT: 255.31 LBS | DIASTOLIC BLOOD PRESSURE: 77 MMHG | BODY MASS INDEX: 43.59 KG/M2

## 2024-09-05 DIAGNOSIS — I82.4Y3 ACUTE DEEP VEIN THROMBOSIS (DVT) OF PROXIMAL VEIN OF BOTH LOWER EXTREMITIES: Primary | ICD-10-CM

## 2024-09-05 DIAGNOSIS — M06.9 RHEUMATOID ARTHRITIS, INVOLVING UNSPECIFIED SITE, UNSPECIFIED WHETHER RHEUMATOID FACTOR PRESENT: ICD-10-CM

## 2024-09-05 DIAGNOSIS — I82.4Y3 ACUTE DEEP VEIN THROMBOSIS (DVT) OF PROXIMAL VEIN OF BOTH LOWER EXTREMITIES: ICD-10-CM

## 2024-09-05 DIAGNOSIS — D68.51 FACTOR V LEIDEN MUTATION: ICD-10-CM

## 2024-09-05 DIAGNOSIS — Z00.00 ROUTINE HEALTH MAINTENANCE: ICD-10-CM

## 2024-09-05 DIAGNOSIS — I26.09 ACUTE PULMONARY EMBOLISM WITH ACUTE COR PULMONALE, UNSPECIFIED PULMONARY EMBOLISM TYPE: ICD-10-CM

## 2024-09-05 LAB
ALBUMIN SERPL BCP-MCNC: 3.4 G/DL (ref 3.5–5.2)
ALP SERPL-CCNC: 115 U/L (ref 55–135)
ALT SERPL W/O P-5'-P-CCNC: 23 U/L (ref 10–44)
ANION GAP SERPL CALC-SCNC: 9 MMOL/L (ref 8–16)
AST SERPL-CCNC: 23 U/L (ref 10–40)
BASOPHILS # BLD AUTO: 0.06 K/UL (ref 0–0.2)
BASOPHILS NFR BLD: 0.5 % (ref 0–1.9)
BILIRUB SERPL-MCNC: 0.3 MG/DL (ref 0.1–1)
BUN SERPL-MCNC: 15 MG/DL (ref 6–20)
CALCIUM SERPL-MCNC: 9.6 MG/DL (ref 8.7–10.5)
CHLORIDE SERPL-SCNC: 100 MMOL/L (ref 95–110)
CO2 SERPL-SCNC: 28 MMOL/L (ref 23–29)
CREAT SERPL-MCNC: 1 MG/DL (ref 0.5–1.4)
DIFFERENTIAL METHOD BLD: ABNORMAL
EOSINOPHIL # BLD AUTO: 0.2 K/UL (ref 0–0.5)
EOSINOPHIL NFR BLD: 1.5 % (ref 0–8)
ERYTHROCYTE [DISTWIDTH] IN BLOOD BY AUTOMATED COUNT: 14.5 % (ref 11.5–14.5)
EST. GFR  (NO RACE VARIABLE): >60 ML/MIN/1.73 M^2
GLUCOSE SERPL-MCNC: 190 MG/DL (ref 70–110)
HCT VFR BLD AUTO: 38 % (ref 37–48.5)
HGB BLD-MCNC: 12.9 G/DL (ref 12–16)
IMM GRANULOCYTES # BLD AUTO: 0.02 K/UL (ref 0–0.04)
IMM GRANULOCYTES NFR BLD AUTO: 0.2 % (ref 0–0.5)
LYMPHOCYTES # BLD AUTO: 1.7 K/UL (ref 1–4.8)
LYMPHOCYTES NFR BLD: 15.3 % (ref 18–48)
MCH RBC QN AUTO: 31.6 PG (ref 27–31)
MCHC RBC AUTO-ENTMCNC: 33.9 G/DL (ref 32–36)
MCV RBC AUTO: 93 FL (ref 82–98)
MONOCYTES # BLD AUTO: 0.8 K/UL (ref 0.3–1)
MONOCYTES NFR BLD: 7 % (ref 4–15)
NEUTROPHILS # BLD AUTO: 8.5 K/UL (ref 1.8–7.7)
NEUTROPHILS NFR BLD: 75.5 % (ref 38–73)
NRBC BLD-RTO: 0 /100 WBC
PLATELET # BLD AUTO: 285 K/UL (ref 150–450)
PMV BLD AUTO: 10.8 FL (ref 9.2–12.9)
POTASSIUM SERPL-SCNC: 3.9 MMOL/L (ref 3.5–5.1)
PROT SERPL-MCNC: 7.4 G/DL (ref 6–8.4)
RBC # BLD AUTO: 4.08 M/UL (ref 4–5.4)
SODIUM SERPL-SCNC: 137 MMOL/L (ref 136–145)
WBC # BLD AUTO: 11.31 K/UL (ref 3.9–12.7)

## 2024-09-05 PROCEDURE — 36415 COLL VENOUS BLD VENIPUNCTURE: CPT | Mod: PN | Performed by: INTERNAL MEDICINE

## 2024-09-05 PROCEDURE — 99999 PR PBB SHADOW E&M-EST. PATIENT-LVL III: CPT | Mod: PBBFAC,,, | Performed by: STUDENT IN AN ORGANIZED HEALTH CARE EDUCATION/TRAINING PROGRAM

## 2024-09-05 PROCEDURE — 85025 COMPLETE CBC W/AUTO DIFF WBC: CPT | Mod: PN | Performed by: INTERNAL MEDICINE

## 2024-09-05 PROCEDURE — 80053 COMPREHEN METABOLIC PANEL: CPT | Mod: PN | Performed by: INTERNAL MEDICINE

## 2024-09-23 DIAGNOSIS — I10 ESSENTIAL HYPERTENSION: ICD-10-CM

## 2024-09-23 RX ORDER — CAPTOPRIL 12.5 MG/1
12.5 TABLET ORAL 3 TIMES DAILY
Qty: 270 TABLET | Refills: 3 | Status: SHIPPED | OUTPATIENT
Start: 2024-09-23

## 2024-10-04 DIAGNOSIS — E05.90 HYPERTHYROIDISM: ICD-10-CM

## 2024-10-04 RX ORDER — METHIMAZOLE 5 MG/1
5 TABLET ORAL EVERY OTHER DAY
Qty: 45 TABLET | Refills: 3 | Status: SHIPPED | OUTPATIENT
Start: 2024-10-04

## 2024-10-23 ENCOUNTER — HOSPITAL ENCOUNTER (OUTPATIENT)
Dept: RADIOLOGY | Facility: HOSPITAL | Age: 59
Discharge: HOME | End: 2024-10-23
Attending: STUDENT IN AN ORGANIZED HEALTH CARE EDUCATION/TRAINING PROGRAM
Payer: COMMERCIAL

## 2024-10-23 VITALS — WEIGHT: 255 LBS | HEIGHT: 64 IN | BODY MASS INDEX: 43.54 KG/M2

## 2024-10-23 DIAGNOSIS — Z00.00 ROUTINE HEALTH MAINTENANCE: ICD-10-CM

## 2024-10-23 PROCEDURE — 77063 BREAST TOMOSYNTHESIS BI: CPT | Mod: 26,,, | Performed by: RADIOLOGY

## 2024-10-23 PROCEDURE — 77067 SCR MAMMO BI INCL CAD: CPT | Mod: TC

## 2024-10-23 PROCEDURE — 77067 SCR MAMMO BI INCL CAD: CPT | Mod: 26,,, | Performed by: RADIOLOGY

## 2024-10-23 PROCEDURE — 77063 BREAST TOMOSYNTHESIS BI: CPT | Mod: TC

## 2024-11-07 ENCOUNTER — OFFICE VISIT (OUTPATIENT)
Dept: PRIMARY CARE CLINIC | Facility: CLINIC | Age: 59
End: 2024-11-07
Payer: COMMERCIAL

## 2024-11-07 VITALS
WEIGHT: 251.31 LBS | HEIGHT: 64 IN | SYSTOLIC BLOOD PRESSURE: 128 MMHG | HEART RATE: 74 BPM | DIASTOLIC BLOOD PRESSURE: 80 MMHG | BODY MASS INDEX: 42.9 KG/M2

## 2024-11-07 DIAGNOSIS — E05.90 HYPERTHYROIDISM: ICD-10-CM

## 2024-11-07 DIAGNOSIS — Z12.11 ENCOUNTER FOR COLORECTAL CANCER SCREENING: ICD-10-CM

## 2024-11-07 DIAGNOSIS — M06.9 RHEUMATOID ARTHRITIS, INVOLVING UNSPECIFIED SITE, UNSPECIFIED WHETHER RHEUMATOID FACTOR PRESENT: ICD-10-CM

## 2024-11-07 DIAGNOSIS — Z12.12 ENCOUNTER FOR COLORECTAL CANCER SCREENING: ICD-10-CM

## 2024-11-07 DIAGNOSIS — Z13.1 SCREENING FOR DIABETES MELLITUS: ICD-10-CM

## 2024-11-07 DIAGNOSIS — Z00.00 ANNUAL PHYSICAL EXAM: Primary | ICD-10-CM

## 2024-11-07 DIAGNOSIS — I10 PRIMARY HYPERTENSION: ICD-10-CM

## 2024-11-07 DIAGNOSIS — D68.51 FACTOR V LEIDEN MUTATION: ICD-10-CM

## 2024-11-07 PROCEDURE — 99386 PREV VISIT NEW AGE 40-64: CPT | Mod: S$GLB,,, | Performed by: STUDENT IN AN ORGANIZED HEALTH CARE EDUCATION/TRAINING PROGRAM

## 2024-11-07 PROCEDURE — 3074F SYST BP LT 130 MM HG: CPT | Mod: CPTII,S$GLB,, | Performed by: STUDENT IN AN ORGANIZED HEALTH CARE EDUCATION/TRAINING PROGRAM

## 2024-11-07 PROCEDURE — 3008F BODY MASS INDEX DOCD: CPT | Mod: CPTII,S$GLB,, | Performed by: STUDENT IN AN ORGANIZED HEALTH CARE EDUCATION/TRAINING PROGRAM

## 2024-11-07 PROCEDURE — 4010F ACE/ARB THERAPY RXD/TAKEN: CPT | Mod: CPTII,S$GLB,, | Performed by: STUDENT IN AN ORGANIZED HEALTH CARE EDUCATION/TRAINING PROGRAM

## 2024-11-07 PROCEDURE — 99999 PR PBB SHADOW E&M-EST. PATIENT-LVL III: CPT | Mod: PBBFAC,,, | Performed by: STUDENT IN AN ORGANIZED HEALTH CARE EDUCATION/TRAINING PROGRAM

## 2024-11-07 PROCEDURE — 3079F DIAST BP 80-89 MM HG: CPT | Mod: CPTII,S$GLB,, | Performed by: STUDENT IN AN ORGANIZED HEALTH CARE EDUCATION/TRAINING PROGRAM

## 2024-11-07 PROCEDURE — 1160F RVW MEDS BY RX/DR IN RCRD: CPT | Mod: CPTII,S$GLB,, | Performed by: STUDENT IN AN ORGANIZED HEALTH CARE EDUCATION/TRAINING PROGRAM

## 2024-11-07 PROCEDURE — 1159F MED LIST DOCD IN RCRD: CPT | Mod: CPTII,S$GLB,, | Performed by: STUDENT IN AN ORGANIZED HEALTH CARE EDUCATION/TRAINING PROGRAM

## 2024-11-07 NOTE — PROGRESS NOTES
SUBJECTIVE     Chief Complaint   Patient presents with    Establish Care    Annual Exam       HPI  Francia Rod is a 59 y.o. female with medical diagnoses as listed in the medical history and problem list that presents for annual exam. Pt is establishing care with me today.    Pt is not UTD on age appropriate CA screening. Due for mammogram and colon cancer screening.    Family, social, surgical Hx reviewed     Hx of recurrent unprovoked DVT/PE: On Eliquis 5 mg bid. Factor V Leiden mutation    RA: Managed without medication    HTN: On amlodipine 10 mg daily, bisoprolol-hctz 5-6.25 mg daily, and captopril 12.5mg tid    Hyperthyroidism: Methimazole 5 mg every other day    Health Maintenance         Date Due Completion Date    Hepatitis C Screening Never done ---    Cervical Cancer Screening Never done ---    Pneumococcal Vaccines (Age 0-64) (1 of 2 - PCV) Never done ---    HIV Screening Never done ---    TETANUS VACCINE Never done ---    Colorectal Cancer Screening Never done ---    Shingles Vaccine (1 of 2) Never done ---    RSV Vaccine (Age 60+ and Pregnant patients) (1 - Risk 60-74 years 1-dose series) Never done ---    Influenza Vaccine (1) Never done ---    COVID-19 Vaccine (3 - 2024-25 season) 09/01/2024 8/25/2021    Hemoglobin A1c (Diabetic Prevention Screening) 10/15/2024 10/15/2021    Mammogram 10/23/2025 10/23/2024    Lipid Panel 10/15/2026 10/15/2021              PAST MEDICAL HISTORY:  Past Medical History:   Diagnosis Date    Arthritis     DVT (deep venous thrombosis)     Factor V Leiden mutation 10/23/2020    Hypertension     Pulmonary embolism     Shingles     Thyroid disease        PAST SURGICAL HISTORY:  Past Surgical History:   Procedure Laterality Date    CHOLECYSTECTOMY         SOCIAL HISTORY:  Social History     Socioeconomic History    Marital status:    Tobacco Use    Smoking status: Never    Smokeless tobacco: Never   Substance and Sexual Activity    Alcohol use: Yes     Comment:  occassionally    Drug use: Never    Sexual activity: Yes     Partners: Male     Birth control/protection: None     Social Drivers of Health     Financial Resource Strain: Patient Declined (1/18/2024)    Overall Financial Resource Strain (CARDIA)     Difficulty of Paying Living Expenses: Patient declined   Food Insecurity: Patient Declined (1/18/2024)    Hunger Vital Sign     Worried About Running Out of Food in the Last Year: Patient declined     Ran Out of Food in the Last Year: Patient declined   Transportation Needs: No Transportation Needs (1/18/2024)    PRAPARE - Transportation     Lack of Transportation (Medical): No     Lack of Transportation (Non-Medical): No   Physical Activity: Unknown (1/18/2024)    Exercise Vital Sign     Days of Exercise per Week: 0 days   Stress: No Stress Concern Present (1/18/2024)    [employer] [place] [employer] - Occupational Stress Questionnaire     Feeling of Stress : Only a little   Housing Stability: Unknown (1/18/2024)    Housing Stability Vital Sign     Unable to Pay for Housing in the Last Year: No     Unstable Housing in the Last Year: No       FAMILY HISTORY:  Family History   Problem Relation Name Age of Onset    Heart attack Mother Mother         smoker    Rheumatoid arthritis Mother Mother     Hypertension Mother Mother     Stroke Mother Mother     Pacemaker/defibrilator Mother Mother     Rheum arthritis Father Jesus Durand     Cancer Father Jesus Durand         unsure of type    Heart disease Father Jesus Durand         bypass    Clotting disorder Sister      Heart disease Sister          bypass    Colon cancer Paternal Grandmother      Heart attack Maternal Uncle      Heart attack Paternal Uncle         ALLERGIES AND MEDICATIONS: updated and reviewed.  Review of patient's allergies indicates:  No Known Allergies  Current Outpatient Medications   Medication Sig Dispense Refill    amLODIPine (NORVASC) 10 MG tablet Take 10 mg by mouth once daily.      apixaban  "(ELIQUIS) 5 mg Tab Take 1 tablet (5 mg total) by mouth 2 (two) times daily. 60 tablet 6    bisoprolol-hydrochlorothiazide 5-6.25 mg (ZIAC) 5-6.25 mg Tab TAKE 1 TABLET BY MOUTH EVERY DAY 90 tablet 3    captopriL (CAPOTEN) 12.5 MG tablet Take 1 tablet (12.5 mg total) by mouth 3 (three) times daily. 270 tablet 3    FOLIC ACID ORAL Take by mouth.      methIMAzole (TAPAZOLE) 5 MG Tab Take 1 tablet (5 mg total) by mouth every other day. 45 tablet 3     No current facility-administered medications for this visit.       ROS  Review of Systems   Constitutional:  Negative for fever and weight loss.   Respiratory:  Negative for cough and shortness of breath.    Cardiovascular:  Negative for chest pain and palpitations.   Gastrointestinal:  Negative for abdominal pain, constipation, diarrhea, nausea and vomiting.   Genitourinary:  Negative for dysuria.   Musculoskeletal:  Negative for back pain and joint pain.   Skin:  Negative for rash.   Neurological:  Negative for dizziness, weakness and headaches.   Psychiatric/Behavioral:  Negative for depression. The patient is not nervous/anxious.            OBJECTIVE     Physical Exam  Vitals:    11/07/24 0847   BP: 128/80   Pulse: 74    Body mass index is 43.14 kg/m².  Weight: 114 kg (251 lb 5.2 oz)   Height: 5' 4" (162.6 cm)     Physical Exam  HENT:      Head: Normocephalic and atraumatic.      Nose: Nose normal.      Mouth/Throat:      Mouth: Mucous membranes are moist.      Pharynx: Oropharynx is clear.   Eyes:      Extraocular Movements: Extraocular movements intact.      Conjunctiva/sclera: Conjunctivae normal.      Pupils: Pupils are equal, round, and reactive to light.   Cardiovascular:      Rate and Rhythm: Normal rate and regular rhythm.   Pulmonary:      Effort: Pulmonary effort is normal.      Breath sounds: Normal breath sounds.   Musculoskeletal:         General: No swelling. Normal range of motion.      Cervical back: Normal range of motion.      Right lower leg: No " edema.      Left lower leg: No edema.   Skin:     General: Skin is warm.      Findings: No lesion or rash.   Neurological:      General: No focal deficit present.      Mental Status: She is alert and oriented to person, place, and time.      Motor: No weakness.   Psychiatric:         Mood and Affect: Mood normal.         Thought Content: Thought content normal.               ASSESSMENT     59 y.o. female with     1. Annual physical exam    2. Primary hypertension    3. Rheumatoid arthritis, involving unspecified site, unspecified whether rheumatoid factor present    4. Factor V Leiden mutation    5. Encounter for colorectal cancer screening    6. Screening for diabetes mellitus    7. Hyperthyroidism        PLAN:     1. Annual physical exam  -     Ambulatory referral/consult to Obstetrics / Gynecology; Future; Expected date: 11/14/2024  -     HIV 1/2 Ag/Ab (4th Gen); Future; Expected date: 11/07/2024  -     Hepatitis C Antibody; Future; Expected date: 11/07/2024  -     TSH; Future; Expected date: 11/07/2024  -     Lipid Panel; Future; Expected date: 11/07/2024  -     Comprehensive Metabolic Panel; Future; Expected date: 11/07/2024  -     CBC Auto Differential; Future; Expected date: 11/07/2024  -     HIV 1/2 Ag/Ab (4th Gen); Future; Expected date: 11/10/2024  -     Hepatitis C Antibody; Future; Expected date: 11/10/2024  -     TSH; Future; Expected date: 11/10/2024  -     Lipid Panel; Future; Expected date: 11/10/2024    2. Primary hypertension  Stable on medications, continue regimen    3. Rheumatoid arthritis, involving unspecified site, unspecified whether rheumatoid factor present  Stable    4. Factor V Leiden mutation  Stable on medications, continue regimen    5. Encounter for colorectal cancer screening  Due for colon cancer screening    6. Screening for diabetes mellitus  -     Hemoglobin A1C; Future; Expected date: 11/07/2024  -     Hemoglobin A1C; Future; Expected date: 11/10/2024    7.  Hyperthyroidism  Stable on medications, continue regimen        Discussed age and gender appropriate screenings at this visit and encouraged a healthy diet low in simple carbohydrates, and increased physical activity.  Counseled on medically appropriate vaccines based on age and current health condition.  Screening test reviewed and discussed with patient.      RTC in 1 year   Salome Chinchilla MD

## 2024-11-10 ENCOUNTER — TELEPHONE (OUTPATIENT)
Dept: PRIMARY CARE CLINIC | Facility: CLINIC | Age: 59
End: 2024-11-10
Payer: COMMERCIAL

## 2024-11-11 NOTE — TELEPHONE ENCOUNTER
----- Message from Salome Chinchilla MD sent at 11/10/2024  1:45 PM CST -----  Regarding: Labs  Please let her know, we received her labs from Dr. Medrano who only did a CBC and CMP. I ordered the remainder of annual labs. She should be fasting when she has them done. Thanks!

## 2024-11-18 ENCOUNTER — PATIENT MESSAGE (OUTPATIENT)
Dept: ADMINISTRATIVE | Facility: HOSPITAL | Age: 59
End: 2024-11-18
Payer: COMMERCIAL

## 2024-11-26 ENCOUNTER — PATIENT OUTREACH (OUTPATIENT)
Dept: ADMINISTRATIVE | Facility: HOSPITAL | Age: 59
End: 2024-11-26
Payer: COMMERCIAL

## 2024-11-26 DIAGNOSIS — Z12.11 SCREENING FOR COLON CANCER: Primary | ICD-10-CM

## 2025-01-30 ENCOUNTER — PATIENT MESSAGE (OUTPATIENT)
Dept: PRIMARY CARE CLINIC | Facility: CLINIC | Age: 60
End: 2025-01-30
Payer: COMMERCIAL

## 2025-01-30 DIAGNOSIS — I10 ESSENTIAL HYPERTENSION: ICD-10-CM

## 2025-01-31 RX ORDER — BISOPROLOL FUMARATE AND HYDROCHLOROTHIAZIDE 5; 6.25 MG/1; MG/1
1 TABLET ORAL DAILY
Qty: 90 TABLET | Refills: 3 | Status: SHIPPED | OUTPATIENT
Start: 2025-01-31

## 2025-01-31 NOTE — TELEPHONE ENCOUNTER
LOV with Salome Chinchilla MD , 11/7/2024  Rx came from [facility]  last year with a years worth of refills. Pt states that  has taken over the rx and is in need of a refills.  Rx pended for review.

## 2025-01-31 NOTE — TELEPHONE ENCOUNTER
No care due was identified.  [facility] Embedded Care Due Messages. Reference number: [id number].   1/31/2025 10:00:11 AM CST

## 2025-04-16 ENCOUNTER — PATIENT MESSAGE (OUTPATIENT)
Dept: PRIMARY CARE CLINIC | Facility: CLINIC | Age: 60
End: 2025-04-16
Payer: COMMERCIAL

## 2025-04-16 DIAGNOSIS — I26.09 ACUTE PULMONARY EMBOLISM WITH ACUTE COR PULMONALE, UNSPECIFIED PULMONARY EMBOLISM TYPE: ICD-10-CM

## 2025-04-16 DIAGNOSIS — I82.4Y3 ACUTE DEEP VEIN THROMBOSIS (DVT) OF PROXIMAL VEIN OF BOTH LOWER EXTREMITIES: ICD-10-CM

## 2025-04-16 NOTE — TELEPHONE ENCOUNTER
No care due was identified.  [facility] Embedded Care Due Messages. Reference number: [id number].   4/16/2025 11:19:02 AM CDT

## 2025-04-16 NOTE — TELEPHONE ENCOUNTER
LOV with Salome Chinchilla MD , 11/7/24 (annual)  Pt requesting refill Eliquis. Previuosly prescribed by Dr. Ebony Jose ([facility])  1/30/25 encounter. Pt stated care no longer managed by [facility], and Dr. Romo said to go through PCP for rx refills.  Eliquis pended, if appropriate.

## 2025-04-25 ENCOUNTER — OFFICE VISIT (OUTPATIENT)
Dept: PRIMARY CARE CLINIC | Facility: CLINIC | Age: 60
End: 2025-04-25
Payer: COMMERCIAL

## 2025-04-25 VITALS
HEART RATE: 66 BPM | WEIGHT: 245.56 LBS | OXYGEN SATURATION: 95 % | DIASTOLIC BLOOD PRESSURE: 80 MMHG | BODY MASS INDEX: 42.16 KG/M2 | SYSTOLIC BLOOD PRESSURE: 122 MMHG

## 2025-04-25 DIAGNOSIS — M06.9 RHEUMATOID ARTHRITIS, INVOLVING UNSPECIFIED SITE, UNSPECIFIED WHETHER RHEUMATOID FACTOR PRESENT: ICD-10-CM

## 2025-04-25 DIAGNOSIS — E05.90 HYPERTHYROIDISM: ICD-10-CM

## 2025-04-25 DIAGNOSIS — E66.01 MORBID (SEVERE) OBESITY DUE TO EXCESS CALORIES: Primary | ICD-10-CM

## 2025-04-25 DIAGNOSIS — I10 PRIMARY HYPERTENSION: ICD-10-CM

## 2025-04-25 PROCEDURE — 99999 PR PBB SHADOW E&M-EST. PATIENT-LVL III: CPT | Mod: PBBFAC,,, | Performed by: STUDENT IN AN ORGANIZED HEALTH CARE EDUCATION/TRAINING PROGRAM

## 2025-04-25 NOTE — PROGRESS NOTES
SUBJECTIVE     Chief Complaint   Patient presents with    Follow-up       HPI  Francia Rod is a 60 y.o. female with medical diagnoses as listed in the medical history and problem list that presents for 6 month follow up.    Pt is not UTD on age appropriate CA screening. Due for mammogram and colon cancer screening.    Family, social, surgical Hx reviewed     Hx of recurrent unprovoked DVT/PE: On Eliquis 5 mg bid. Factor V Leiden mutation    RA: Managed without medication    HTN: On amlodipine 10 mg daily, bisoprolol-hctz 5-6.25 mg daily, and captopril 12.5mg tid    Hyperthyroidism: Methimazole 5 mg every other day    Health Maintenance         Date Due Completion Date    Hepatitis C Screening Never done ---    Cervical Cancer Screening Never done ---    HIV Screening Never done ---    TETANUS VACCINE Never done ---    Pneumococcal Vaccines (Age 50+) (1 of 2 - PCV) Never done ---    Colorectal Cancer Screening Never done ---    Shingles Vaccine (1 of 2) Never done ---    RSV Vaccine (Age 60+ and Pregnant patients) (1 - Risk 60-74 years 1-dose series) Never done ---    Influenza Vaccine (1) Never done ---    COVID-19 Vaccine (3 - 2024-25 season) 09/01/2024 8/25/2021    Hemoglobin A1c (Diabetic Prevention Screening) 10/15/2024 10/15/2021    Mammogram 10/23/2025 10/23/2024    Lipid Panel 10/15/2026 10/15/2021              PAST MEDICAL HISTORY:  Past Medical History:   Diagnosis Date    Arthritis     DVT (deep venous thrombosis)     Factor V Leiden mutation 10/23/2020    Hypertension     Pulmonary embolism     Shingles     Thyroid disease        PAST SURGICAL HISTORY:  Past Surgical History:   Procedure Laterality Date    CHOLECYSTECTOMY         SOCIAL HISTORY:  Social History     Socioeconomic History    Marital status:    Tobacco Use    Smoking status: Never    Smokeless tobacco: Never   Substance and Sexual Activity    Alcohol use: Yes     Comment: occassionally    Drug use: Never    Sexual activity: Yes      Partners: Male     Birth control/protection: None     Social Drivers of Health     Financial Resource Strain: Patient Declined (1/18/2024)    Overall Financial Resource Strain (CARDIA)     Difficulty of Paying Living Expenses: Patient declined   Food Insecurity: Patient Declined (1/18/2024)    Hunger Vital Sign     Worried About Running Out of Food in the Last Year: Patient declined     Ran Out of Food in the Last Year: Patient declined   Transportation Needs: No Transportation Needs (1/18/2024)    PRAPARE - Transportation     Lack of Transportation (Medical): No     Lack of Transportation (Non-Medical): No   Physical Activity: Unknown (1/18/2024)    Exercise Vital Sign     Days of Exercise per Week: 0 days   Stress: No Stress Concern Present (1/18/2024)    [employer] [employer] - Occupational Stress Questionnaire     Feeling of Stress : Only a little   Housing Stability: Unknown (1/18/2024)    Housing Stability Vital Sign     Unable to Pay for Housing in the Last Year: No     Unstable Housing in the Last Year: No       FAMILY HISTORY:  Family History   Problem Relation Name Age of Onset    Heart attack Mother Mother         smoker    Rheumatoid arthritis Mother Mother     Hypertension Mother Mother     Stroke Mother Mother     Pacemaker/defibrilator Mother Mother     Rheum arthritis Father Jesus Durand     Cancer Father Jesus Durand         unsure of type    Heart disease Father Jesus Durand         bypass    Clotting disorder Sister      Heart disease Sister          bypass    Colon cancer Paternal Grandmother      Heart attack Maternal Uncle      Heart attack Paternal Uncle         ALLERGIES AND MEDICATIONS: updated and reviewed.  Review of patient's allergies indicates:  No Known Allergies  Current Outpatient Medications   Medication Sig Dispense Refill    amLODIPine (NORVASC) 10 MG tablet Take 10 mg by mouth once daily.      apixaban (ELIQUIS) 5 mg Tab Take 1 tablet (5 mg total) by mouth 2  (two) times daily. 60 tablet 6    bisoprolol-hydrochlorothiazide 5-6.25 mg (ZIAC) 5-6.25 mg Tab Take 1 tablet by mouth once daily. 90 tablet 3    captopriL (CAPOTEN) 12.5 MG tablet Take 1 tablet (12.5 mg total) by mouth 3 (three) times daily. (Patient taking differently: Take 12.5 mg by mouth 2 (two) times daily.) 270 tablet 3    FOLIC ACID ORAL Take by mouth.      methIMAzole (TAPAZOLE) 5 MG Tab Take 1 tablet (5 mg total) by mouth every other day. 45 tablet 3     No current facility-administered medications for this visit.       ROS  Review of Systems   Constitutional:  Negative for fever and weight loss.   HENT:  Negative for hearing loss.    Eyes:  Negative for discharge.   Respiratory:  Negative for cough, shortness of breath and wheezing.    Cardiovascular:  Negative for chest pain and palpitations.   Gastrointestinal:  Negative for abdominal pain, blood in stool, constipation, diarrhea, nausea and vomiting.   Genitourinary:  Negative for dysuria and hematuria.   Musculoskeletal:  Negative for back pain, joint pain and neck pain.   Skin:  Negative for rash.   Neurological:  Negative for dizziness, weakness and headaches.   Endo/Heme/Allergies:  Negative for polydipsia.   Psychiatric/Behavioral:  Negative for depression. The patient is not nervous/anxious.            OBJECTIVE     Physical Exam  Vitals:    04/25/25 1045   BP: 122/80   Pulse: 66    Body mass index is 42.16 kg/m².  Weight: 111.4 kg (245 lb 9.5 oz)         Physical Exam  HENT:      Head: Normocephalic and atraumatic.      Nose: Nose normal.      Mouth/Throat:      Mouth: Mucous membranes are moist.      Pharynx: Oropharynx is clear.   Eyes:      Extraocular Movements: Extraocular movements intact.      Conjunctiva/sclera: Conjunctivae normal.      Pupils: Pupils are equal, round, and reactive to light.   Cardiovascular:      Rate and Rhythm: Normal rate and regular rhythm.   Pulmonary:      Effort: Pulmonary effort is normal.      Breath sounds:  Normal breath sounds.   Musculoskeletal:         General: No swelling. Normal range of motion.      Cervical back: Normal range of motion.      Right lower leg: No edema.      Left lower leg: No edema.   Skin:     General: Skin is warm.      Findings: No lesion or rash.   Neurological:      General: No focal deficit present.      Mental Status: She is alert and oriented to person, place, and time.      Motor: No weakness.   Psychiatric:         Mood and Affect: Mood normal.         Thought Content: Thought content normal.               ASSESSMENT     60 y.o. female with     1. Morbid (severe) obesity due to excess calories    2. Rheumatoid arthritis, involving unspecified site, unspecified whether rheumatoid factor present    3. Primary hypertension    4. Hyperthyroidism        PLAN:     1. Morbid (severe) obesity due to excess calories  Continue working on improving diet and including exercise daily.    2. Rheumatoid arthritis, involving unspecified site, unspecified whether rheumatoid factor present  Stable    3. Primary hypertension  -     Hypertension Digital Medicine (HDMP) Enrollment Order  Stable on medications, continue regimen    4. Hyperthyroidism  Stable on medications, continue regimen          Discussed age and gender appropriate screenings at this visit and encouraged a healthy diet low in simple carbohydrates, and increased physical activity.  Counseled on medically appropriate vaccines based on age and current health condition.  Screening test reviewed and discussed with patient.      RTC in 6 months    Salome Chinchilla MD

## 2025-05-15 ENCOUNTER — LAB VISIT (OUTPATIENT)
Dept: LAB | Facility: HOSPITAL | Age: 60
End: 2025-05-15
Attending: STUDENT IN AN ORGANIZED HEALTH CARE EDUCATION/TRAINING PROGRAM
Payer: COMMERCIAL

## 2025-05-15 DIAGNOSIS — Z00.00 ANNUAL PHYSICAL EXAM: ICD-10-CM

## 2025-05-15 DIAGNOSIS — Z13.1 SCREENING FOR DIABETES MELLITUS: ICD-10-CM

## 2025-05-15 LAB
ABSOLUTE EOSINOPHIL (OHS): 0.25 K/UL
ABSOLUTE MONOCYTE (OHS): 0.82 K/UL (ref 0.3–1)
ABSOLUTE NEUTROPHIL COUNT (OHS): 6.44 K/UL (ref 1.8–7.7)
ALBUMIN SERPL BCP-MCNC: 3.3 G/DL (ref 3.5–5.2)
ALP SERPL-CCNC: 112 UNIT/L (ref 40–150)
ALT SERPL W/O P-5'-P-CCNC: 16 UNIT/L (ref 10–44)
ANION GAP (OHS): 10 MMOL/L (ref 8–16)
AST SERPL-CCNC: 19 UNIT/L (ref 11–45)
BASOPHILS # BLD AUTO: 0.06 K/UL
BASOPHILS NFR BLD AUTO: 0.6 %
BILIRUB SERPL-MCNC: 0.5 MG/DL (ref 0.1–1)
BUN SERPL-MCNC: 13 MG/DL (ref 8–23)
CALCIUM SERPL-MCNC: 9.4 MG/DL (ref 8.7–10.5)
CHLORIDE SERPL-SCNC: 102 MMOL/L (ref 95–110)
CHOLEST SERPL-MCNC: 217 MG/DL (ref 120–199)
CHOLEST/HDLC SERPL: 2.6 {RATIO} (ref 2–5)
CO2 SERPL-SCNC: 28 MMOL/L (ref 23–29)
CREAT SERPL-MCNC: 0.8 MG/DL (ref 0.5–1.4)
EAG (OHS): 197 MG/DL (ref 68–131)
ERYTHROCYTE [DISTWIDTH] IN BLOOD BY AUTOMATED COUNT: 14.3 % (ref 11.5–14.5)
GFR SERPLBLD CREATININE-BSD FMLA CKD-EPI: >60 ML/MIN/1.73/M2
GLUCOSE SERPL-MCNC: 179 MG/DL (ref 70–110)
HBA1C MFR BLD: 8.5 % (ref 4–5.6)
HCT VFR BLD AUTO: 41 % (ref 37–48.5)
HCV AB SERPL QL IA: NORMAL
HDLC SERPL-MCNC: 82 MG/DL (ref 40–75)
HDLC SERPL: 37.8 % (ref 20–50)
HGB BLD-MCNC: 12.9 GM/DL (ref 12–16)
HIV 1+2 AB+HIV1 P24 AG SERPL QL IA: NORMAL
IMM GRANULOCYTES # BLD AUTO: 0.04 K/UL (ref 0–0.04)
IMM GRANULOCYTES NFR BLD AUTO: 0.4 % (ref 0–0.5)
LDLC SERPL CALC-MCNC: 104.4 MG/DL (ref 63–159)
LYMPHOCYTES # BLD AUTO: 1.75 K/UL (ref 1–4.8)
MCH RBC QN AUTO: 29.9 PG (ref 27–31)
MCHC RBC AUTO-ENTMCNC: 31.5 G/DL (ref 32–36)
MCV RBC AUTO: 95 FL (ref 82–98)
NONHDLC SERPL-MCNC: 135 MG/DL
NUCLEATED RBC (/100WBC) (OHS): 0 /100 WBC
PLATELET # BLD AUTO: 272 K/UL (ref 150–450)
PMV BLD AUTO: 11.7 FL (ref 9.2–12.9)
POTASSIUM SERPL-SCNC: 4.3 MMOL/L (ref 3.5–5.1)
PROT SERPL-MCNC: 7.8 GM/DL (ref 6–8.4)
RBC # BLD AUTO: 4.31 M/UL (ref 4–5.4)
RELATIVE EOSINOPHIL (OHS): 2.7 %
RELATIVE LYMPHOCYTE (OHS): 18.7 % (ref 18–48)
RELATIVE MONOCYTE (OHS): 8.8 % (ref 4–15)
RELATIVE NEUTROPHIL (OHS): 68.8 % (ref 38–73)
SODIUM SERPL-SCNC: 140 MMOL/L (ref 136–145)
TRIGL SERPL-MCNC: 153 MG/DL (ref 30–150)
TSH SERPL-ACNC: 1.62 UIU/ML (ref 0.4–4)
WBC # BLD AUTO: 9.36 K/UL (ref 3.9–12.7)

## 2025-05-15 PROCEDURE — 87389 HIV-1 AG W/HIV-1&-2 AB AG IA: CPT

## 2025-05-15 PROCEDURE — 36415 COLL VENOUS BLD VENIPUNCTURE: CPT

## 2025-05-15 PROCEDURE — 83036 HEMOGLOBIN GLYCOSYLATED A1C: CPT

## 2025-05-15 PROCEDURE — 85025 COMPLETE CBC W/AUTO DIFF WBC: CPT

## 2025-05-15 PROCEDURE — 80053 COMPREHEN METABOLIC PANEL: CPT

## 2025-05-15 PROCEDURE — 80061 LIPID PANEL: CPT

## 2025-05-15 PROCEDURE — 84443 ASSAY THYROID STIM HORMONE: CPT

## 2025-05-15 PROCEDURE — 86803 HEPATITIS C AB TEST: CPT

## 2025-05-19 ENCOUNTER — PATIENT MESSAGE (OUTPATIENT)
Dept: PRIMARY CARE CLINIC | Facility: CLINIC | Age: 60
End: 2025-05-19
Payer: COMMERCIAL

## 2025-05-20 NOTE — TELEPHONE ENCOUNTER
Last Office Visit Info:   The patient's last visit with Salome Chinchilla MD was on 4/25/2025.    No/unknown dosage on medication list for the folic acid. Message sent to patient to confirm.

## 2025-05-28 ENCOUNTER — PATIENT MESSAGE (OUTPATIENT)
Dept: PRIMARY CARE CLINIC | Facility: CLINIC | Age: 60
End: 2025-05-28
Payer: COMMERCIAL

## 2025-05-29 ENCOUNTER — PATIENT MESSAGE (OUTPATIENT)
Dept: PRIMARY CARE CLINIC | Facility: CLINIC | Age: 60
End: 2025-05-29
Payer: COMMERCIAL

## 2025-05-29 ENCOUNTER — RESULTS FOLLOW-UP (OUTPATIENT)
Dept: PRIMARY CARE CLINIC | Facility: CLINIC | Age: 60
End: 2025-05-29

## 2025-05-29 DIAGNOSIS — E11.9 TYPE 2 DIABETES MELLITUS WITHOUT COMPLICATION, WITHOUT LONG-TERM CURRENT USE OF INSULIN: Primary | ICD-10-CM

## 2025-05-29 RX ORDER — METFORMIN HYDROCHLORIDE 500 MG/1
500 TABLET, EXTENDED RELEASE ORAL 2 TIMES DAILY WITH MEALS
Qty: 180 TABLET | Refills: 3 | Status: SHIPPED | OUTPATIENT
Start: 2025-05-29 | End: 2026-05-29

## 2025-07-10 DIAGNOSIS — E05.90 PRETIBIAL MYXEDEMA: Primary | ICD-10-CM

## 2025-07-18 ENCOUNTER — HOSPITAL ENCOUNTER (OUTPATIENT)
Dept: RADIOLOGY | Facility: HOSPITAL | Age: 60
Discharge: HOME | End: 2025-07-18
Attending: INTERNAL MEDICINE
Payer: COMMERCIAL

## 2025-07-18 DIAGNOSIS — E05.90 PRETIBIAL MYXEDEMA: ICD-10-CM

## 2025-07-18 PROCEDURE — 76536 US EXAM OF HEAD AND NECK: CPT | Mod: TC

## 2025-07-18 PROCEDURE — 76536 US EXAM OF HEAD AND NECK: CPT | Mod: 26,,, | Performed by: RADIOLOGY
